# Patient Record
Sex: FEMALE | Race: BLACK OR AFRICAN AMERICAN | NOT HISPANIC OR LATINO | Employment: FULL TIME | URBAN - METROPOLITAN AREA
[De-identification: names, ages, dates, MRNs, and addresses within clinical notes are randomized per-mention and may not be internally consistent; named-entity substitution may affect disease eponyms.]

---

## 2017-01-08 ENCOUNTER — HOSPITAL ENCOUNTER (EMERGENCY)
Facility: HOSPITAL | Age: 47
Discharge: HOME | End: 2017-01-08
Attending: EMERGENCY MEDICINE
Payer: MEDICAID

## 2017-01-08 VITALS
HEART RATE: 83 BPM | TEMPERATURE: 98 F | HEIGHT: 64 IN | RESPIRATION RATE: 18 BRPM | OXYGEN SATURATION: 99 % | DIASTOLIC BLOOD PRESSURE: 66 MMHG | WEIGHT: 160 LBS | SYSTOLIC BLOOD PRESSURE: 139 MMHG | BODY MASS INDEX: 27.31 KG/M2

## 2017-01-08 DIAGNOSIS — K04.01 ACUTE PULPITIS: Primary | ICD-10-CM

## 2017-01-08 PROCEDURE — 99283 EMERGENCY DEPT VISIT LOW MDM: CPT | Mod: 25

## 2017-01-08 PROCEDURE — 63600175 PHARM REV CODE 636 W HCPCS: Performed by: EMERGENCY MEDICINE

## 2017-01-08 PROCEDURE — 96372 THER/PROPH/DIAG INJ SC/IM: CPT

## 2017-01-08 RX ORDER — AMOXICILLIN AND CLAVULANATE POTASSIUM 875; 125 MG/1; MG/1
1 TABLET, FILM COATED ORAL 2 TIMES DAILY
Qty: 20 TABLET | Refills: 0 | Status: SHIPPED | OUTPATIENT
Start: 2017-01-08 | End: 2017-01-18

## 2017-01-08 RX ORDER — KETOROLAC TROMETHAMINE 30 MG/ML
30 INJECTION, SOLUTION INTRAMUSCULAR; INTRAVENOUS
Status: COMPLETED | OUTPATIENT
Start: 2017-01-08 | End: 2017-01-08

## 2017-01-08 RX ADMIN — KETOROLAC TROMETHAMINE 30 MG: 30 INJECTION, SOLUTION INTRAMUSCULAR at 06:01

## 2017-01-08 NOTE — ED AVS SNAPSHOT
[facility]  [place] [id number]               Janna Jacobs   2017  6:12 PM   ED    Description:  Female : 1970   Department:  [facility]           Your Care was Coordinated By:     Provider Role From To    Nolan Crystal MD Attending Provider [phone] --      Reason for Visit     Dental Pain           Diagnoses this Visit        Comments    Acute pulpitis    -  Primary       ED Disposition     None           To Do List           Follow-up Information     Follow up with Dentist. Schedule an appointment as soon as possible for a visit in 1 week.       These Medications        Disp Refills Start End    amoxicillin-clavulanate 875-125mg (AUGMENTIN) 875-125 mg per tablet 20 tablet 0 2017    Take 1 tablet by mouth 2 (two) times daily. - Oral      [facility]     [facility] On Call Nurse Care Line -  Assistance  Registered nurses in the [facility] provide clinical advisement, health education, appointment booking, and other advisory services.  Call for this free service at [phone].             Medications           Message regarding Medications     Verify the changes and/or additions to your medication regime listed below are the same as discussed with your clinician today.  If any of these changes or additions are incorrect, please notify your healthcare provider.        START taking these NEW medications        Refills    amoxicillin-clavulanate 875-125mg (AUGMENTIN) 875-125 mg per tablet 0    Sig: Take 1 tablet by mouth 2 (two) times daily.    Class: Print    Route: Oral           Verify that the below list of medications is an accurate representation of the medications you are currently taking.  If none reported, the list may be blank. If incorrect, please contact your healthcare provider. Carry this list with you in case of emergency.           Current Medications      "amoxicillin-clavulanate 875-125mg (AUGMENTIN) 875-125 mg per tablet Take 1 tablet by mouth 2 (two) times daily.    hydrocodone-acetaminophen 5-325mg (NORCO) 5-325 mg per tablet Take 1 tablet by mouth every 4 (four) hours as needed for Pain.           Clinical Reference Information           Your Vitals Were     BP Pulse Temp Resp Height Weight    134/59 (BP Location: Left arm, Patient Position: Sitting) 96 98.5 °F (36.9 °C) (Oral) 18 5' 4" (1.626 m) 72.6 kg (160 lb)    SpO2 BMI             96% 27.46 kg/m2         Allergies as of 1/8/2017     No Known Allergies      Immunizations Administered on Date of Encounter - 1/8/2017     None      ED Micro, Lab, POCT     None      ED Imaging Orders     None        Discharge Instructions       [facility] [phone]  [facility] [phone]  [facility] [phone]  [facility] 0-6 years - [phone]                                                         7-13 years - [phone]  [facility] - [phone]  [facility] [phone]  [facility] [phone]  [facility] [phone]  [facility] for HIV patients [phone]    Dr. Hummel - [facility] [address] [facility] [phone]    [facility] Sign-Up     Activating your [facility] account is as easy as 1-2-3!     1) Visit [email or web address], select Sign Up Now, enter this activation code and your date of birth, then select Next.  [email or web address]  Expires: 2/22/2017  6:15 PM      2) Create a username and password to use when you visit [facility] in the future and select a security question in case you lose your password and select Next.    3) Enter your e-mail address and click Sign Up!    Additional Information  If you have questions, please e-mail [email or web address] or call [phone] to talk to our [facility] staff. Remember, [facility] is NOT to be " used for urgent needs. For medical emergencies, dial 911.          [facility] complies with applicable Federal civil rights laws and does not discriminate on the basis of race, color, national origin, age, disability, or sex.        Language Assistance Services     ATTENTION: Language assistance services are available, free of charge. Please call [phone].      ATENCIÓN: Si habla español, tiene a bolton disposición servicios gratuitos de asistencia lingüística. Llame al [phone].     CHÚ Ý: N?u b?n nói Ti?ng Vi?t, có các d?ch v? h? tr? ngôn ng? mi?n phí dành cho b?n. G?i s? [phone].

## 2017-01-09 NOTE — ED PROVIDER NOTES
"Encounter Date: 1/8/2017    SCRIBE #1 NOTE: I, Esmer Lund, am scribing for, and in the presence of,  Nolan Crystal MD. I have scribed the following portions of the note - Other sections scribed: ROS and HPI.       History     Chief Complaint   Patient presents with    Dental Pain     "My mouth is swollen and it's hurting."      Review of patient's allergies indicates:  No Known Allergies  HPI Comments: CC: Dental Pain    HPI: This 46 y.o. female with no past medical history on file, presents to the ED complaining of L upper dental pain with associated L sided facial swelling that she noticed upon waking this morning. Symptoms are acute, moderate, and constant. She denies fever, drainage, trouble swallowing or any other associated symptoms. She reports similar facial swelling last week, which was resolved after taking Benadryl. No reported medical intervention today.    The history is provided by the patient. No  was used.     History reviewed. No pertinent past medical history.  Past Medical History Pertinent Negatives   Diagnosis Date Noted    Asthma 12/5/2013    Depression 12/5/2013    Diabetes mellitus 12/5/2013    GERD (gastroesophageal reflux disease) 12/5/2013    Hypertension 12/5/2013     History reviewed. No pertinent past surgical history.  History reviewed. No pertinent family history.  Social History   Substance Use Topics    Smoking status: Never Smoker    Smokeless tobacco: None    Alcohol use No     Review of Systems   Constitutional: Negative for fever.   HENT: Positive for dental problem (L upper) and facial swelling (L). Negative for trouble swallowing.    Respiratory: Negative for cough.    Gastrointestinal: Negative for nausea and vomiting.       Physical Exam   Initial Vitals   BP Pulse Resp Temp SpO2   01/08/17 1742 01/08/17 1742 01/08/17 1742 01/08/17 1742 01/08/17 1742   134/59 96 18 98.5 °F (36.9 °C) 96 %     Physical Exam    Nursing note and vitals " reviewed.  Constitutional: She appears well-developed and well-nourished.   HENT:   Head: Normocephalic and atraumatic.   Infected dental caries noted to the left upper region   Eyes: EOM are normal. Pupils are equal, round, and reactive to light.   Cardiovascular: Normal rate, regular rhythm, normal heart sounds and intact distal pulses.   Pulmonary/Chest: Breath sounds normal. No respiratory distress. She has no wheezes. She has no rhonchi. She has no rales.   Abdominal: Soft. Bowel sounds are normal. She exhibits no distension. There is no tenderness. There is no rebound and no guarding.   Musculoskeletal: Normal range of motion. She exhibits no edema or tenderness.   Neurological: She is alert and oriented to person, place, and time. She has normal strength.   Skin: Skin is warm and dry.   Psychiatric: She has a normal mood and affect.         ED Course   Procedures  Labs Reviewed - No data to display         dental pain secondary to infected dental caries.  Discharge with Augmentin and dental follow-up.             Scribe Attestation:   Scribe #1: I performed the above scribed service and the documentation accurately describes the services I performed. I attest to the accuracy of the note.    Attending Attestation:           Physician Attestation for Scribe:  Physician Attestation Statement for Scribe #1: I, Nolan Crystal MD, reviewed documentation, as scribed by Esmer Lund in my presence, and it is both accurate and complete.                 ED Course     Clinical Impression:   The encounter diagnosis was Acute pulpitis.          Nolan Crystal MD  01/08/17 0967

## 2017-01-09 NOTE — ED TRIAGE NOTES
Pt with c/o left side facial swelling and pain that began this morning when she woke up. Pt denies fever.

## 2017-01-09 NOTE — DISCHARGE INSTRUCTIONS
[facility] [phone]  [facility] [phone]  [facility] [phone]  [facility] 0-6 years - [phone]                                                         7-13 years - [phone]  [facility] - [phone]  [facility] [phone]  [facility] [phone]  [facility] [phone]  [facility] for HIV patients [phone]    Dr. Hummel - [facility] [address] [facility] [phone]

## 2017-03-08 ENCOUNTER — HOSPITAL ENCOUNTER (EMERGENCY)
Facility: HOSPITAL | Age: 47
Discharge: HOME | End: 2017-03-08
Attending: EMERGENCY MEDICINE
Payer: MEDICAID

## 2017-03-08 VITALS
WEIGHT: 179 LBS | BODY MASS INDEX: 35.14 KG/M2 | HEART RATE: 75 BPM | OXYGEN SATURATION: 100 % | HEIGHT: 60 IN | SYSTOLIC BLOOD PRESSURE: 136 MMHG | DIASTOLIC BLOOD PRESSURE: 72 MMHG | RESPIRATION RATE: 16 BRPM | TEMPERATURE: 99 F

## 2017-03-08 DIAGNOSIS — R05.9 COUGH: ICD-10-CM

## 2017-03-08 DIAGNOSIS — R12 HEARTBURN: ICD-10-CM

## 2017-03-08 DIAGNOSIS — J06.9 VIRAL URI WITH COUGH: Primary | ICD-10-CM

## 2017-03-08 LAB
B-HCG UR QL: NEGATIVE
CTP QC/QA: YES

## 2017-03-08 PROCEDURE — 25000003 PHARM REV CODE 250: Performed by: PHYSICIAN ASSISTANT

## 2017-03-08 PROCEDURE — 81025 URINE PREGNANCY TEST: CPT | Performed by: EMERGENCY MEDICINE

## 2017-03-08 PROCEDURE — 99284 EMERGENCY DEPT VISIT MOD MDM: CPT | Mod: 25

## 2017-03-08 PROCEDURE — 93005 ELECTROCARDIOGRAM TRACING: CPT

## 2017-03-08 RX ORDER — BENZONATATE 100 MG/1
100 CAPSULE ORAL 3 TIMES DAILY PRN
Qty: 12 CAPSULE | Refills: 0 | Status: SHIPPED | OUTPATIENT
Start: 2017-03-08 | End: 2017-03-13

## 2017-03-08 RX ORDER — IBUPROFEN 600 MG/1
600 TABLET ORAL EVERY 6 HOURS PRN
Qty: 12 TABLET | Refills: 0 | Status: SHIPPED | OUTPATIENT
Start: 2017-03-08 | End: 2017-03-13

## 2017-03-08 RX ORDER — FAMOTIDINE 20 MG/1
20 TABLET, FILM COATED ORAL 2 TIMES DAILY PRN
Qty: 12 TABLET | Refills: 0 | Status: SHIPPED | OUTPATIENT
Start: 2017-03-08 | End: 2017-03-21

## 2017-03-08 RX ORDER — FAMOTIDINE 20 MG/1
20 TABLET, FILM COATED ORAL
Status: COMPLETED | OUTPATIENT
Start: 2017-03-08 | End: 2017-03-08

## 2017-03-08 RX ORDER — BENZONATATE 100 MG/1
100 CAPSULE ORAL
Status: COMPLETED | OUTPATIENT
Start: 2017-03-08 | End: 2017-03-08

## 2017-03-08 RX ADMIN — BENZONATATE 100 MG: 100 CAPSULE ORAL at 02:03

## 2017-03-08 RX ADMIN — FAMOTIDINE 20 MG: 20 TABLET, FILM COATED ORAL at 02:03

## 2017-03-08 NOTE — DISCHARGE INSTRUCTIONS
Viral Upper Respiratory Illness (Adult)  You have a viral upper respiratory illness (URI), which is another term for the common cold. This illness is contagious during the first few days. It is spread through the air by coughing and sneezing. It may also be spread by direct contact (touching the sick person and then touching your own eyes, nose, or mouth). Frequent handwashing will decrease risk of spread. Most viral illnesses go away within 7 to 10 days with rest and simple home remedies. Sometimes the illness may last for several weeks. Antibiotics will not kill a virus, and they are generally not prescribed for this condition.    Home care  · If symptoms are severe, rest at home for the first 2 to 3 days. When you resume activity, don't let yourself get too tired.  · Avoid being exposed to cigarette smoke (yours or others).  · You may use acetaminophen or ibuprofen to control pain and fever, unless another medicine was prescribed. (Note: If you have chronic liver or kidney disease, have ever had a stomach ulcer or gastrointestinal bleeding, or are taking blood-thinning medicines, talk with your healthcare provider before using these medicines.) Aspirin should never be given to anyone under 18 years of age who is ill with a viral infection or fever. It may cause severe liver or brain damage.  · Your appetite may be poor, so a light diet is fine. Avoid dehydration by drinking 6 to 8 glasses of fluids per day (water, soft drinks, juices, tea, or soup). Extra fluids will help loosen secretions in the nose and lungs.  · Over-the-counter cold medicines will not shorten the length of time youre sick, but they may be helpful for the following symptoms: cough, sore throat, and nasal and sinus congestion. (Note: Do not use decongestants if you have high blood pressure.)  Follow-up care  Follow up with your healthcare provider, or as advised.  When to seek medical advice  Call your healthcare provider right away if any  of these occur:  · Cough with lots of colored sputum (mucus)  · Severe headache; face, neck, or ear pain  · Difficulty swallowing due to throat pain  · Fever of 100.4°F (38°C)  Call 911, or get immediate medical care  Call emergency services right away if any of these occur:  · Chest pain, shortness of breath, wheezing, or difficulty breathing  · Coughing up blood  · Inability to swallow due to throat pain  Date Last Reviewed: 9/13/2015  © 6154-9454 [employer]. [address] [place] [address]. All rights reserved. This information is not intended as a substitute for professional medical care. Always follow your healthcare professional's instructions.

## 2017-03-08 NOTE — ED TRIAGE NOTES
Coughing dry cough and pressure and gagging and throwing up. Co workers with same 2 weeks ago. No fevers. Dizziness, pressure in sinus. No sore throat. + diarrhea.

## 2017-03-08 NOTE — ED PROVIDER NOTES
"Encounter Date: 3/8/2017    SCRIBE #1 NOTE: I, Daniel Taylor, am scribing for, and in the presence of,  Sun Marroquin PA-C. I have scribed the following portions of the note - Other sections scribed: HPI and ROS.       History     Chief Complaint   Patient presents with    Cough     Pt reports cough x 2 weeks     Review of patient's allergies indicates:  No Known Allergies  HPI Comments: CC: Cough     HPI: This 47 y.o F everyday smoker presents to the ED c/o subjective fever, nasal congestion, rhinorrhea and productive cough with clear sputum which began last week. She reports associated nausea, vomiting, diarrhea and dizziness. She also c/o anterior chest wall pain described as "pressure" that is worse with coughing. Pt states she is having worsening heartburn since her arrival at this facility and states it feels similar to her usual heartburn. Pt reports co-workers are experiencing similar symptoms. Pt attempted tx with OTC Tylenol and Nyquil with no relief of symptoms. The pt denies ear pain, sore throat, abdominal pain, HA and blood in stool or vomit.    The history is provided by the patient. No  was used.     Past Medical History:   Diagnosis Date    GERD (gastroesophageal reflux disease)      Past Surgical History:   Procedure Laterality Date    FINGER EXPLORATION      TUBAL LIGATION       History reviewed. No pertinent family history.  Social History   Substance Use Topics    Smoking status: Current Some Day Smoker     Types: Cigarettes    Smokeless tobacco: None    Alcohol use Yes      Comment: occ     Review of Systems   Constitutional: Positive for chills and fever (subjective).   HENT: Positive for congestion (sinus), dental problem, ear pain and rhinorrhea. Negative for sore throat.    Eyes: Negative for discharge, redness and itching.   Respiratory: Positive for cough (productive, clear sputum). Negative for shortness of breath.    Cardiovascular: Positive for chest pain " "("pressure", secondary to cough).   Gastrointestinal: Positive for diarrhea (2 days), nausea and vomiting (3 episodes). Negative for abdominal pain.   Genitourinary: Negative for dysuria, frequency, hematuria and urgency.   Musculoskeletal: Negative for back pain.   Skin: Negative for rash.   Neurological: Positive for dizziness. Negative for numbness and headaches.   Hematological: Does not bruise/bleed easily.       Physical Exam   Initial Vitals   BP Pulse Resp Temp SpO2   03/08/17 1207 03/08/17 1207 03/08/17 1207 03/08/17 1207 03/08/17 1207   108/59 85 20 98.5 °F (36.9 °C) 99 %     Physical Exam    Nursing note and vitals reviewed.  Constitutional: She appears well-developed and well-nourished.   HENT:   Head: Normocephalic and atraumatic.   Right Ear: Hearing, tympanic membrane, external ear and ear canal normal.   Left Ear: Hearing, tympanic membrane, external ear and ear canal normal.   Nose: Mucosal edema and rhinorrhea present. Right sinus exhibits no maxillary sinus tenderness and no frontal sinus tenderness. Left sinus exhibits no maxillary sinus tenderness and no frontal sinus tenderness.   Mouth/Throat: Oropharynx is clear and moist and mucous membranes are normal. No oropharyngeal exudate, posterior oropharyngeal edema or posterior oropharyngeal erythema.   Cardiovascular: Normal rate and regular rhythm.   Pulmonary/Chest: She has decreased breath sounds in the right middle field. She has no wheezes. She has no rhonchi. She has no rales. She exhibits tenderness.   Abdominal: Soft. Normal appearance and bowel sounds are normal. There is no tenderness.         ED Course   Procedures  Labs Reviewed   POCT URINE PREGNANCY             Medical Decision Making:   Initial Assessment:   Patient is a 47-year-old female who presents for evaluation of subjective fever, productive cough, pleuritic chest pain and other cold symptoms. She also c/o worsening heartburn since her arrival. Pt is afebrile, in no acute " distress.  On exam, diminished lung sounds in right middle field with no wheezing, rhonchi or rales. Tenderness to anterior chest wall with palpation. CXR negative for pneumonia or acute abnormalities. EKG ordered and negative for STEMI or acute abnormalities. Patient given Pepcid and Tessalon in ED.  Upon reevaluation, patient reports feeling much improved.  Discharge patient to home with symptomatic treatment of viral URI and GERD-Tessalon, ibuprofen, Pepcid. Provided discharge instructions for GERD and supportive care for viral URI. PCP follow up.  Return to ED if symptoms worsen, if develop chest pain, shortness of breath, or as needed    I discussed this pt with Dr. Rangel who agrees with assessment and plan.  Differential Diagnosis:   PNA, viral URI, bronchitis, MI, GERD, pleuritic CP, PTX            Scribe Attestation:   Scribe #1: I performed the above scribed service and the documentation accurately describes the services I performed. I attest to the accuracy of the note.    Attending Attestation:     Physician Attestation Statement for NP/PA:   I discussed this assessment and plan of this patient with the NP/PA, but I did not personally examine the patient. The face to face encounter was performed by the NP/PA.    Other NP/PA Attestation Additions:      Medical Decision Making: I HAVE REVIEWED THE CASE WITH MY APC AND AGREE WITH THE HISTORY, ROS, PHYSICAL, ASSESSMENT AND PLAN OF CARE AS DOCUMENTED BY MY ADVANCED PRACTICE CLINICIAN.    I DISCUSSED THIS ASSESSMENT AND PLAN OF THIS PATIENT WITH THE APC, BUT I DID NOT PERSONALLY EXAMINE THE PATIENT.  THE FACE TO FACE ENCOUNTER WAS PERFORMED BY THE APC.         Physician Attestation for Scribe:  Physician Attestation Statement for Scribe #1: I, Sun Marroquin PA-C, reviewed documentation, as scribed by Daniel Taylor in my presence, and it is both accurate and complete.                 ED Course     Clinical Impression:   The primary encounter diagnosis was Viral  URI with cough. Diagnoses of Cough and Heartburn were also pertinent to this visit.          Evens Rangel MD  03/12/17 2548

## 2017-03-08 NOTE — ED AVS SNAPSHOT
[facility]  [place] [id number]               Janna Jacobs   3/8/2017  1:35 PM   ED    Description:  Female : 1970   Department:  [facility]           Your Care was Coordinated By:     Provider Role From To    Evens Rangel MD Attending Provider [phone] --    Sun Marroquin PA-C Physician Assistant 17 [phone] --      Reason for Visit     Cough           Diagnoses this Visit        Comments    Viral URI with cough    -  Primary     Cough         Heartburn           ED Disposition     None           To Do List           Follow-up Information     Follow up with Princess Arana NP. Schedule an appointment as soon as possible for a visit in 2 days.    Specialty:  Family Medicine    Why:  for follow up    Contact information:    [place] [id number] [phone]          Go to [facility].    Specialty:  Emergency Medicine    Why:  As needed, If symptoms worsen    Contact information:    [address] [place] [address] [phone]       These Medications        Disp Refills Start End    benzonatate (TESSALON) 100 MG capsule 12 capsule 0 3/8/2017 3/13/2017    Take 1 capsule (100 mg total) by mouth 3 (three) times daily as needed for Cough. - Oral    famotidine (PEPCID) 20 MG tablet 12 tablet 0 3/8/2017 3/13/2017    Take 1 tablet (20 mg total) by mouth 2 (two) times daily as needed for Heartburn. - Oral    ibuprofen (ADVIL,MOTRIN) 600 MG tablet 12 tablet 0 3/8/2017 3/13/2017    Take 1 tablet (600 mg total) by mouth every 6 (six) hours as needed. - Oral      [facility]     [facility] -  Assistance  Registered nurses in the [facility] provide clinical advisement, health education, appointment booking, and other advisory services.  Call for this free service at [phone].             Medications           Message  regarding Medications     Verify the changes and/or additions to your medication regime listed below are the same as discussed with your clinician today.  If any of these changes or additions are incorrect, please notify your healthcare provider.        START taking these NEW medications        Refills    benzonatate (TESSALON) 100 MG capsule 0    Sig: Take 1 capsule (100 mg total) by mouth 3 (three) times daily as needed for Cough.    Class: Print    Route: Oral    famotidine (PEPCID) 20 MG tablet 0    Sig: Take 1 tablet (20 mg total) by mouth 2 (two) times daily as needed for Heartburn.    Class: Print    Route: Oral    ibuprofen (ADVIL,MOTRIN) 600 MG tablet 0    Sig: Take 1 tablet (600 mg total) by mouth every 6 (six) hours as needed.    Class: Print    Route: Oral      These medications were administered today        Dose Freq    benzonatate capsule 100 mg 100 mg ED 1 Time    Sig: Take 1 capsule (100 mg total) by mouth ED 1 Time.    Class: Normal    Route: Oral    Cosign for Ordering: Accepted by Evens Rangel MD on 3/8/2017  2:33 PM    famotidine tablet 20 mg 20 mg ED 1 Time    Sig: Take 1 tablet (20 mg total) by mouth ED 1 Time.    Class: Normal    Route: Oral    Cosign for Ordering: Accepted by Evens Rangel MD on 3/8/2017  2:33 PM           Verify that the below list of medications is an accurate representation of the medications you are currently taking.  If none reported, the list may be blank. If incorrect, please contact your healthcare provider. Carry this list with you in case of emergency.           Current Medications     benzonatate (TESSALON) 100 MG capsule Take 1 capsule (100 mg total) by mouth 3 (three) times daily as needed for Cough.    famotidine (PEPCID) 20 MG tablet Take 1 tablet (20 mg total) by mouth 2 (two) times daily as needed for Heartburn.    famotidine tablet 20 mg Take 1 tablet (20 mg total) by mouth ED 1 Time.    ibuprofen (ADVIL,MOTRIN) 600 MG tablet Take 1 tablet (600 mg total)  by mouth every 6 (six) hours as needed.           Clinical Reference Information           Your Vitals Were     BP Pulse Temp Resp Height Weight    136/72 (BP Location: Left arm, Patient Position: Sitting, BP Method: Automatic) 75 98.7 °F (37.1 °C) (Oral) 16 5' (1.524 m) 81.2 kg (179 lb)    Last Period SpO2 BMI          02/01/2017 (Exact Date) 100% 34.96 kg/m2        Allergies as of 3/8/2017     No Known Allergies      Immunizations Administered on Date of Encounter - 3/8/2017     None      ED Micro, Lab, POCT     Start Ordered       Status Ordering Provider    03/08/17 1331 03/08/17 1331  POCT urine pregnancy  Once      Final result       ED Imaging Orders     Start Ordered       Status Ordering Provider    03/08/17 1351 03/08/17 1355  X-Ray Chest PA And Lateral  1 time imaging      Final result         Discharge Instructions         Viral Upper Respiratory Illness (Adult)  You have a viral upper respiratory illness (URI), which is another term for the common cold. This illness is contagious during the first few days. It is spread through the air by coughing and sneezing. It may also be spread by direct contact (touching the sick person and then touching your own eyes, nose, or mouth). Frequent handwashing will decrease risk of spread. Most viral illnesses go away within 7 to 10 days with rest and simple home remedies. Sometimes the illness may last for several weeks. Antibiotics will not kill a virus, and they are generally not prescribed for this condition.    Home care  · If symptoms are severe, rest at home for the first 2 to 3 days. When you resume activity, don't let yourself get too tired.  · Avoid being exposed to cigarette smoke (yours or others).  · You may use acetaminophen or ibuprofen to control pain and fever, unless another medicine was prescribed. (Note: If you have chronic liver or kidney disease, have ever had a stomach ulcer or gastrointestinal bleeding, or are taking blood-thinning medicines,  talk with your healthcare provider before using these medicines.) Aspirin should never be given to anyone under 18 years of age who is ill with a viral infection or fever. It may cause severe liver or brain damage.  · Your appetite may be poor, so a light diet is fine. Avoid dehydration by drinking 6 to 8 glasses of fluids per day (water, soft drinks, juices, tea, or soup). Extra fluids will help loosen secretions in the nose and lungs.  · Over-the-counter cold medicines will not shorten the length of time youre sick, but they may be helpful for the following symptoms: cough, sore throat, and nasal and sinus congestion. (Note: Do not use decongestants if you have high blood pressure.)  Follow-up care  Follow up with your healthcare provider, or as advised.  When to seek medical advice  Call your healthcare provider right away if any of these occur:  · Cough with lots of colored sputum (mucus)  · Severe headache; face, neck, or ear pain  · Difficulty swallowing due to throat pain  · Fever of 100.4°F (38°C)  Call 911, or get immediate medical care  Call emergency services right away if any of these occur:  · Chest pain, shortness of breath, wheezing, or difficulty breathing  · Coughing up blood  · Inability to swallow due to throat pain  Date Last Reviewed: 9/13/2015  © 2758-7716 [employer]. [address] [place] [address], [place] [address]. All rights reserved. This information is not intended as a substitute for professional medical care. Always follow your healthcare professional's instructions.            Discharge References/Attachments     GASTROESOPHAGEAL REFLUX DISEASE (GERD), DISCHARGE INSTRUCTIONS (ENGLISH)       [facility] complies with applicable Federal civil rights laws and does not discriminate on the basis of race, color, national origin, age, disability, or sex.        Language Assistance Services     ATTENTION: Language assistance services are available, free of charge.  Please call [phone].      ATENCIÓN: Si habla español, tiene a bolton disposición servicios gratuitos de asistencia lingüística. Llame al [phone].     CHÚ Ý: N?u b?n nói Ti?ng Vi?t, có các d?ch v? h? tr? ngôn ng? mi?n phí dành cho b?n. G?i s? [phone].

## 2017-03-21 ENCOUNTER — HOSPITAL ENCOUNTER (EMERGENCY)
Facility: HOSPITAL | Age: 47
Discharge: HOME | End: 2017-03-21
Attending: EMERGENCY MEDICINE
Payer: MEDICAID

## 2017-03-21 VITALS
HEART RATE: 83 BPM | RESPIRATION RATE: 20 BRPM | BODY MASS INDEX: 32.79 KG/M2 | TEMPERATURE: 98 F | DIASTOLIC BLOOD PRESSURE: 87 MMHG | HEIGHT: 60 IN | WEIGHT: 167 LBS | OXYGEN SATURATION: 97 % | SYSTOLIC BLOOD PRESSURE: 136 MMHG

## 2017-03-21 DIAGNOSIS — R09.82 POST-NASAL DRIP: ICD-10-CM

## 2017-03-21 DIAGNOSIS — J01.40 SUBACUTE PANSINUSITIS: Primary | ICD-10-CM

## 2017-03-21 PROCEDURE — 99283 EMERGENCY DEPT VISIT LOW MDM: CPT | Mod: 25

## 2017-03-21 PROCEDURE — 25000003 PHARM REV CODE 250: Performed by: EMERGENCY MEDICINE

## 2017-03-21 PROCEDURE — 96372 THER/PROPH/DIAG INJ SC/IM: CPT

## 2017-03-21 PROCEDURE — 63600175 PHARM REV CODE 636 W HCPCS: Performed by: EMERGENCY MEDICINE

## 2017-03-21 RX ORDER — DEXAMETHASONE SODIUM PHOSPHATE 4 MG/ML
12 INJECTION, SOLUTION INTRA-ARTICULAR; INTRALESIONAL; INTRAMUSCULAR; INTRAVENOUS; SOFT TISSUE
Status: COMPLETED | OUTPATIENT
Start: 2017-03-21 | End: 2017-03-21

## 2017-03-21 RX ORDER — GUAIFENESIN 100 MG/5ML
100-200 SOLUTION ORAL EVERY 4 HOURS PRN
Qty: 60 ML | Refills: 0 | Status: SHIPPED | OUTPATIENT
Start: 2017-03-21 | End: 2017-03-31

## 2017-03-21 RX ORDER — ACETAMINOPHEN 325 MG/1
650 TABLET ORAL
Status: COMPLETED | OUTPATIENT
Start: 2017-03-21 | End: 2017-03-21

## 2017-03-21 RX ORDER — CETIRIZINE HYDROCHLORIDE 10 MG/1
10 TABLET ORAL DAILY
Qty: 30 TABLET | Refills: 0 | Status: SHIPPED | OUTPATIENT
Start: 2017-03-21 | End: 2018-03-21

## 2017-03-21 RX ORDER — FLUTICASONE PROPIONATE 50 MCG
1 SPRAY, SUSPENSION (ML) NASAL 2 TIMES DAILY PRN
Qty: 15 G | Refills: 0 | Status: SHIPPED | OUTPATIENT
Start: 2017-03-21 | End: 2019-02-02

## 2017-03-21 RX ADMIN — ACETAMINOPHEN 650 MG: 325 TABLET ORAL at 08:03

## 2017-03-21 RX ADMIN — DEXAMETHASONE SODIUM PHOSPHATE 12 MG: 4 INJECTION, SOLUTION INTRAMUSCULAR; INTRAVENOUS at 08:03

## 2017-03-21 NOTE — ED AVS SNAPSHOT
[facility]  [place] [address]               Janna Jacobs   3/21/2017  7:29 PM   ED    Description:  Female : 1970   Department:  [facility]           Your Care was Coordinated By:     Provider Role From To    Ning Emery MD Attending Provider 17 --      Reason for Visit     Facial Pain           Diagnoses this Visit        Comments    Subacute pansinusitis    -  Primary     Post-nasal drip           ED Disposition     ED Disposition Condition Comment    Discharge             To Do List           Follow-up Information     Follow up with Princess Arana NP. Schedule an appointment as soon as possible for a visit in 2 days.    Specialty:  Family Medicine    Contact information:    [address] [place] [address] [phone]          Follow up with Nir Boogie - Otorhinolaryngology. Schedule an appointment as soon as possible for a visit in 2 days.    Specialty:  Otolaryngology    Contact information:    [address]  [facility] [address] [phone]    Additional information:    [facility] - 4th Floor        Follow up with Nir Boog[facility] - Allergy/ Immunology. Schedule an appointment as soon as possible for a visit in 2 days.    Specialty:  Allergy    Contact information:    [address]  [facility] [address] [phone]    Additional information:    [facility].       These Medications        Disp Refills Start End    fluticasone (FLONASE) 50 mcg/actuation nasal spray 15 g 0 3/21/2017     1 spray by Each Nare route 2 (two) times daily as needed for Rhinitis. - Each Nare    cetirizine (ZYRTEC) 10 MG tablet 30 tablet 0 3/21/2017 3/21/2018    Take 1 tablet (10 mg total) by mouth once daily. - Oral    guaifenesin 100 mg/5 ml (ROBITUSSIN) 100 mg/5 mL syrup 60 mL 0 3/21/2017 3/31/2017    Take 5-10 mLs (100-200 mg total) by mouth every 4  (four) hours as needed for Cough. - Oral      [facility]     [facility] -  Assistance  Registered nurses in the [facility] provide clinical advisement, health education, appointment booking, and other advisory services.  Call for this free service at [phone].             Medications           Message regarding Medications     Verify the changes and/or additions to your medication regime listed below are the same as discussed with your clinician today.  If any of these changes or additions are incorrect, please notify your healthcare provider.        START taking these NEW medications        Refills    fluticasone (FLONASE) 50 mcg/actuation nasal spray 0    Si spray by Each Nare route 2 (two) times daily as needed for Rhinitis.    Class: Print    Route: Each Nare    cetirizine (ZYRTEC) 10 MG tablet 0    Sig: Take 1 tablet (10 mg total) by mouth once daily.    Class: Print    Route: Oral    guaifenesin 100 mg/5 ml (ROBITUSSIN) 100 mg/5 mL syrup 0    Sig: Take 5-10 mLs (100-200 mg total) by mouth every 4 (four) hours as needed for Cough.    Class: Print    Route: Oral      These medications were administered today        Dose Freq    dexamethasone injection 12 mg 12 mg ED 1 Time    Sig: Inject 3 mLs (12 mg total) into the muscle ED 1 Time.    Class: Normal    Route: Intramuscular    acetaminophen tablet 650 mg 650 mg ED 1 Time    Sig: Take 2 tablets (650 mg total) by mouth ED 1 Time.    Class: Normal    Route: Oral      STOP taking these medications     famotidine (PEPCID) 20 MG tablet Take 1 tablet (20 mg total) by mouth 2 (two) times daily as needed for Heartburn.           Verify that the below list of medications is an accurate representation of the medications you are currently taking.  If none reported, the list may be blank. If incorrect, please contact your healthcare provider. Carry this list with you in case of emergency.           Current Medications      acetaminophen tablet 650 mg Take 2 tablets (650 mg total) by mouth ED 1 Time.    cetirizine (ZYRTEC) 10 MG tablet Take 1 tablet (10 mg total) by mouth once daily.    dexamethasone injection 12 mg Inject 3 mLs (12 mg total) into the muscle ED 1 Time.    fluticasone (FLONASE) 50 mcg/actuation nasal spray 1 spray by Each Nare route 2 (two) times daily as needed for Rhinitis.    guaifenesin 100 mg/5 ml (ROBITUSSIN) 100 mg/5 mL syrup Take 5-10 mLs (100-200 mg total) by mouth every 4 (four) hours as needed for Cough.           Clinical Reference Information           Your Vitals Were     BP Pulse Temp Resp Height Weight    120/76 (BP Location: Right arm, Patient Position: Sitting) 95 98.5 °F (36.9 °C) (Oral) 16 5' (1.524 m) 75.8 kg (167 lb)    Last Period SpO2 BMI          03/01/2017 99% 32.61 kg/m2        Allergies as of 3/21/2017     No Known Allergies      Immunizations Administered on Date of Encounter - 3/21/2017     None      ED Micro, Lab, POCT     None      ED Imaging Orders     None        Discharge Instructions         Understanding Sinus Problems    You dont often think about your sinuses until theres a problem. One day you realize you cant smell dinner cooking. Or you find you often have headaches or problems breathing through your nose.  Symptoms of sinus problems  Sinus problems can cause uncomfortable symptoms. Your nose may run constantly. You might have trouble sleeping at night. You may even lose your sense of smell. Other symptoms can include:  · Nasal congestion  · Fullness in ears  · Green, yellow, or bloody drainage from the nose  · Trouble tasting food  · Frequent headaches  · Facial pain  · Cough  When sinuses are blocked  If something blocks the passages in the nose or sinuses, mucus cant drain. Mucus-filled sinuses often become infected.  · Colds cause the lining of the nose and sinuses to swell and make extra mucus. A buildup of mucus can lead to a more serious infection.  · Allergies  irritate turbinates and other tissues. This causes swelling, which can cause a blockage. Over time, this irritation can also lead to sacs of swollen tissue (polyps).  · Polyps may form in both the sinuses and nose. Polyps can grow large enough to clog nasal passages and block drainage.  · A crooked (deviated) septum may block nasal passages. This is often the result of an injury.  Date Last Reviewed: 11/1/2016  © 8202-1956 [employer]. [address] [place] [address], [place] [address]. All rights reserved. This information is not intended as a substitute for professional medical care. Always follow your healthcare professional's instructions.          Discharge References/Attachments     SINUSITIS (NO ANTIBIOTICS) (ENGLISH)      Smoking Cessation     If you would like to quit smoking:   You may be eligible for free services if you are a [place] resident and started smoking cigarettes before September 1, 1988.  Call the Smoking Cessation Trust (SCT) toll free at (056) 802-1338 or (544) 252-7817.   Call 1-800-QUIT-NOW if you do not meet the above criteria.             [facility] complies with applicable Federal civil rights laws and does not discriminate on the basis of race, color, national origin, age, disability, or sex.        Language Assistance Services     ATTENTION: Language assistance services are available, free of charge. Please call [phone].      ATENCIÓN: Si habla español, tiene a bolton disposición servicios gratuitos de asistencia lingüística. Llame al [phone].     CHÚ Ý: N?u b?n nói Ti?ng Vi?t, có các d?ch v? h? tr? ngôn ng? mi?n phí dành cho b?n. G?i s? [phone].

## 2017-03-22 NOTE — DISCHARGE INSTRUCTIONS
Understanding Sinus Problems    You dont often think about your sinuses until theres a problem. One day you realize you cant smell dinner cooking. Or you find you often have headaches or problems breathing through your nose.  Symptoms of sinus problems  Sinus problems can cause uncomfortable symptoms. Your nose may run constantly. You might have trouble sleeping at night. You may even lose your sense of smell. Other symptoms can include:  · Nasal congestion  · Fullness in ears  · Green, yellow, or bloody drainage from the nose  · Trouble tasting food  · Frequent headaches  · Facial pain  · Cough  When sinuses are blocked  If something blocks the passages in the nose or sinuses, mucus cant drain. Mucus-filled sinuses often become infected.  · Colds cause the lining of the nose and sinuses to swell and make extra mucus. A buildup of mucus can lead to a more serious infection.  · Allergies irritate turbinates and other tissues. This causes swelling, which can cause a blockage. Over time, this irritation can also lead to sacs of swollen tissue (polyps).  · Polyps may form in both the sinuses and nose. Polyps can grow large enough to clog nasal passages and block drainage.  · A crooked (deviated) septum may block nasal passages. This is often the result of an injury.  Date Last Reviewed: 11/1/2016  © 7986-3362 [employer]. [address] [place] [address], [place] [address]. All rights reserved. This information is not intended as a substitute for professional medical care. Always follow your healthcare professional's instructions.

## 2017-03-22 NOTE — ED TRIAGE NOTES
Patient c/o sinus congestion, cough, sore throat for 2 weeks. Patient states it is draining into her throat and hurting her throat.

## 2017-03-22 NOTE — ED PROVIDER NOTES
"Encounter Date: 3/21/2017    SCRIBE #1 NOTE: I, Jelani Juarez, am scribing for, and in the presence of,  Ning Emery MD. I have scribed the following portions of the note - Other sections scribed: ROS, HPI, PE.       History     Chief Complaint   Patient presents with    Facial Pain     Pt. c/o facial pressure, cough, and nasal congestion. Pt. states, "I feel it running in the back of my throat and it chokes me".      Review of patient's allergies indicates:  No Known Allergies  HPI Comments: CC: Facial Pain    HPI: Patient is a 47 y.o. F with a past medical history of GERD who presents to the ED for evaluation of acute onset post-nasal drip, cough, subjective fever, nasal congestion, neck pain, decreased appetite and throat itching x1 week. Pain is severe and constant. She attempted treatment with Tylenol and Mucinex with no relief. She denies painful swallowing, wheezing, facial pain, chest pain, and/or shortness of breath. Multiple similar prior episodes. Patient reports being evaluated in the ED last week for the same complaints. She has not followed up with her allergist. No recent steroid injections.      The history is provided by the patient. No  was used.     Past Medical History:   Diagnosis Date    GERD (gastroesophageal reflux disease)      Past Surgical History:   Procedure Laterality Date    FINGER EXPLORATION      TUBAL LIGATION       History reviewed. No pertinent family history.  Social History   Substance Use Topics    Smoking status: Former Smoker     Types: Cigarettes    Smokeless tobacco: None    Alcohol use Yes      Comment: occ     Review of Systems   Constitutional: Positive for appetite change (decreased) and fever (subjective).   HENT: Positive for congestion and postnasal drip. Negative for trouble swallowing.    Eyes: Negative for redness.   Respiratory: Positive for cough. Negative for wheezing.    Cardiovascular: Negative for chest pain. "   Gastrointestinal: Negative for abdominal pain, diarrhea, nausea and vomiting.   Genitourinary: Negative for dysuria.   Musculoskeletal: Positive for neck pain.   Skin: Negative for rash.   Neurological: Negative for headaches.       Physical Exam   Initial Vitals   BP Pulse Resp Temp SpO2   03/21/17 1856 03/21/17 1856 03/21/17 1856 03/21/17 1856 03/21/17 1856   120/76 95 16 98.5 °F (36.9 °C) 99 %     Physical Exam    Nursing note and vitals reviewed.  Constitutional: She appears well-developed and well-nourished. She is cooperative.  Non-toxic appearance. No distress.   HENT:   Head: Normocephalic and atraumatic.   Right Ear: Tympanic membrane normal.   Left Ear: Tympanic membrane normal.   Nose: Mucosal edema and rhinorrhea present. Right sinus exhibits no maxillary sinus tenderness and no frontal sinus tenderness. Left sinus exhibits no maxillary sinus tenderness and no frontal sinus tenderness.   Mouth/Throat: Oropharynx is clear and moist and mucous membranes are normal. No oral lesions. No uvula swelling. No posterior oropharyngeal edema or posterior oropharyngeal erythema.   Eyes: Conjunctivae, EOM and lids are normal. Pupils are equal, round, and reactive to light. Right conjunctiva is not injected. Left conjunctiva is not injected.   Neck: Normal range of motion and full passive range of motion without pain. Neck supple. No thyromegaly present. No JVD present.   Cardiovascular: Normal rate, regular rhythm, normal heart sounds and normal pulses.   Pulmonary/Chest: Effort normal and breath sounds normal. No respiratory distress.   Abdominal: Soft. Normal appearance and bowel sounds are normal. She exhibits no distension and no mass. There is no tenderness.   Musculoskeletal: Normal range of motion.   Neurological: She is alert and oriented to person, place, and time. She has normal strength. No cranial nerve deficit or sensory deficit.   Skin: Skin is warm, dry and intact. No rash noted.   Psychiatric: She  has a normal mood and affect. Her speech is normal and behavior is normal. Judgment and thought content normal.         ED Course   Procedures  Labs Reviewed - No data to display          Medical Decision Making:   Initial Assessment:   Urgent evaluation a 47-year-old female here with second visit for URI symptoms, was seen here 2 weeks previously for cough, and has developed more nasal congestion, and sore throat since that time.  Patient denies sputum production, no wheezing, does report fever 102 yesterday for which she took Tylenol.  Patient was previously sent home with Tessalon pearls, which she endorses have not improved her symptoms.  Chief complaint of facial pain, not reproducible on sinus palpation, however posterior oropharynx with erythema and boggy turbinates bilaterally.  I presume patient's discomfort is still viral in etiology, now with progression to sinusitis and postnasal drip symptoms.            Scribe Attestation:   Scribe #1: I performed the above scribed service and the documentation accurately describes the services I performed. I attest to the accuracy of the note.    Attending Attestation:           Physician Attestation for Scribe:  Physician Attestation Statement for Scribe #1: I, Ning Emery MD, reviewed documentation, as scribed by Jelani Juarez in my presence, and it is both accurate and complete.                 ED Course     Clinical Impression:   The primary encounter diagnosis was Subacute pansinusitis. A diagnosis of Post-nasal drip was also pertinent to this visit.    Disposition:   Disposition: Discharged  Condition: Stable       Ning Emery MD  03/21/17 2118       Ning Emery MD  04/05/17 1430

## 2017-05-15 ENCOUNTER — HOSPITAL ENCOUNTER (INPATIENT)
Facility: HOSPITAL | Age: 47
LOS: 2 days | Discharge: HOME | DRG: 392 | End: 2017-05-17
Attending: EMERGENCY MEDICINE | Admitting: EMERGENCY MEDICINE
Payer: MEDICAID

## 2017-05-15 DIAGNOSIS — R11.2 NAUSEA VOMITING AND DIARRHEA: ICD-10-CM

## 2017-05-15 DIAGNOSIS — A41.9 SEPSIS, DUE TO UNSPECIFIED ORGANISM: ICD-10-CM

## 2017-05-15 DIAGNOSIS — K52.9 ENTEROCOLITIS: Primary | ICD-10-CM

## 2017-05-15 DIAGNOSIS — K21.9 GASTROESOPHAGEAL REFLUX DISEASE, ESOPHAGITIS PRESENCE NOT SPECIFIED: ICD-10-CM

## 2017-05-15 DIAGNOSIS — D72.9 NEUTROPHILIC LEUKOCYTOSIS: ICD-10-CM

## 2017-05-15 DIAGNOSIS — R19.7 NAUSEA VOMITING AND DIARRHEA: ICD-10-CM

## 2017-05-15 DIAGNOSIS — J06.9 VIRAL URI WITH COUGH: ICD-10-CM

## 2017-05-15 DIAGNOSIS — R50.9 ACUTE FEBRILE ILLNESS: ICD-10-CM

## 2017-05-15 PROBLEM — D72.828 NEUTROPHILIC LEUKOCYTOSIS: Status: ACTIVE | Noted: 2017-05-15

## 2017-05-15 LAB
ALBUMIN SERPL BCP-MCNC: 3.5 G/DL
ALP SERPL-CCNC: 59 U/L
ALT SERPL W/O P-5'-P-CCNC: 10 U/L
ANION GAP SERPL CALC-SCNC: 11 MMOL/L
AST SERPL-CCNC: 16 U/L
B-HCG UR QL: NEGATIVE
BACTERIA #/AREA URNS HPF: NORMAL /HPF
BASOPHILS # BLD AUTO: 0.03 K/UL
BASOPHILS NFR BLD: 0.1 %
BILIRUB SERPL-MCNC: 0.5 MG/DL
BILIRUB UR QL STRIP: NEGATIVE
BUN SERPL-MCNC: 5 MG/DL
CALCIUM SERPL-MCNC: 9.8 MG/DL
CHLORIDE SERPL-SCNC: 100 MMOL/L
CLARITY UR: CLEAR
CO2 SERPL-SCNC: 23 MMOL/L
COLOR UR: YELLOW
CREAT SERPL-MCNC: 0.8 MG/DL
CTP QC/QA: YES
DIFFERENTIAL METHOD: ABNORMAL
EOSINOPHIL # BLD AUTO: 0.1 K/UL
EOSINOPHIL NFR BLD: 0.2 %
ERYTHROCYTE [DISTWIDTH] IN BLOOD BY AUTOMATED COUNT: 14 %
EST. GFR  (AFRICAN AMERICAN): >60 ML/MIN/1.73 M^2
EST. GFR  (NON AFRICAN AMERICAN): >60 ML/MIN/1.73 M^2
GLUCOSE SERPL-MCNC: 96 MG/DL
GLUCOSE UR QL STRIP: NEGATIVE
HCT VFR BLD AUTO: 40 %
HGB BLD-MCNC: 13.7 G/DL
HGB UR QL STRIP: ABNORMAL
KETONES UR QL STRIP: NEGATIVE
LACTATE SERPL-SCNC: 1.3 MMOL/L
LEUKOCYTE ESTERASE UR QL STRIP: NEGATIVE
LIPASE SERPL-CCNC: 8 U/L
LYMPHOCYTES # BLD AUTO: 2.9 K/UL
LYMPHOCYTES NFR BLD: 14.1 %
MCH RBC QN AUTO: 29.4 PG
MCHC RBC AUTO-ENTMCNC: 34.3 %
MCV RBC AUTO: 86 FL
MICROSCOPIC COMMENT: NORMAL
MONOCYTES # BLD AUTO: 2.2 K/UL
MONOCYTES NFR BLD: 10.6 %
NEUTROPHILS # BLD AUTO: 15.6 K/UL
NEUTROPHILS NFR BLD: 75.4 %
NITRITE UR QL STRIP: NEGATIVE
PH UR STRIP: 6 [PH] (ref 5–8)
PLATELET # BLD AUTO: 299 K/UL
PMV BLD AUTO: 10.7 FL
POTASSIUM SERPL-SCNC: 4 MMOL/L
PROT SERPL-MCNC: 8 G/DL
PROT UR QL STRIP: NEGATIVE
RBC # BLD AUTO: 4.66 M/UL
RBC #/AREA URNS HPF: 3 /HPF (ref 0–4)
SODIUM SERPL-SCNC: 134 MMOL/L
SP GR UR STRIP: 1.01 (ref 1–1.03)
SQUAMOUS #/AREA URNS HPF: 8 /HPF
URN SPEC COLLECT METH UR: ABNORMAL
UROBILINOGEN UR STRIP-ACNC: ABNORMAL EU/DL
WBC # BLD AUTO: 20.74 K/UL
WBC #/AREA URNS HPF: 0 /HPF (ref 0–5)

## 2017-05-15 PROCEDURE — 99285 EMERGENCY DEPT VISIT HI MDM: CPT

## 2017-05-15 PROCEDURE — 96367 TX/PROPH/DG ADDL SEQ IV INF: CPT

## 2017-05-15 PROCEDURE — 63600175 PHARM REV CODE 636 W HCPCS: Performed by: NURSE PRACTITIONER

## 2017-05-15 PROCEDURE — 83605 ASSAY OF LACTIC ACID: CPT

## 2017-05-15 PROCEDURE — 81025 URINE PREGNANCY TEST: CPT | Performed by: NURSE PRACTITIONER

## 2017-05-15 PROCEDURE — 25000003 PHARM REV CODE 250: Performed by: NURSE PRACTITIONER

## 2017-05-15 PROCEDURE — 87040 BLOOD CULTURE FOR BACTERIA: CPT | Mod: 59

## 2017-05-15 PROCEDURE — 96365 THER/PROPH/DIAG IV INF INIT: CPT

## 2017-05-15 PROCEDURE — 83690 ASSAY OF LIPASE: CPT

## 2017-05-15 PROCEDURE — 96361 HYDRATE IV INFUSION ADD-ON: CPT

## 2017-05-15 PROCEDURE — 11000001 HC ACUTE MED/SURG PRIVATE ROOM

## 2017-05-15 PROCEDURE — 96375 TX/PRO/DX INJ NEW DRUG ADDON: CPT

## 2017-05-15 PROCEDURE — 25500020 PHARM REV CODE 255: Performed by: EMERGENCY MEDICINE

## 2017-05-15 PROCEDURE — 85025 COMPLETE CBC W/AUTO DIFF WBC: CPT

## 2017-05-15 PROCEDURE — 96376 TX/PRO/DX INJ SAME DRUG ADON: CPT

## 2017-05-15 PROCEDURE — 80053 COMPREHEN METABOLIC PANEL: CPT

## 2017-05-15 PROCEDURE — 81000 URINALYSIS NONAUTO W/SCOPE: CPT

## 2017-05-15 RX ORDER — SODIUM CHLORIDE 0.9 % (FLUSH) 0.9 %
3 SYRINGE (ML) INJECTION EVERY 8 HOURS
Status: DISCONTINUED | OUTPATIENT
Start: 2017-05-16 | End: 2017-05-17 | Stop reason: HOSPADM

## 2017-05-15 RX ORDER — HYDROMORPHONE HYDROCHLORIDE 2 MG/ML
0.5 INJECTION, SOLUTION INTRAMUSCULAR; INTRAVENOUS; SUBCUTANEOUS
Status: DISCONTINUED | OUTPATIENT
Start: 2017-05-15 | End: 2017-05-16

## 2017-05-15 RX ORDER — HYDROMORPHONE HYDROCHLORIDE 2 MG/ML
1 INJECTION, SOLUTION INTRAMUSCULAR; INTRAVENOUS; SUBCUTANEOUS
Status: COMPLETED | OUTPATIENT
Start: 2017-05-15 | End: 2017-05-15

## 2017-05-15 RX ORDER — ACETAMINOPHEN 325 MG/1
650 TABLET ORAL EVERY 8 HOURS PRN
Status: DISCONTINUED | OUTPATIENT
Start: 2017-05-15 | End: 2017-05-17 | Stop reason: HOSPADM

## 2017-05-15 RX ORDER — ACETAMINOPHEN 325 MG/1
650 TABLET ORAL
Status: COMPLETED | OUTPATIENT
Start: 2017-05-15 | End: 2017-05-15

## 2017-05-15 RX ORDER — ONDANSETRON 2 MG/ML
4 INJECTION INTRAMUSCULAR; INTRAVENOUS EVERY 12 HOURS PRN
Status: DISCONTINUED | OUTPATIENT
Start: 2017-05-15 | End: 2017-05-15

## 2017-05-15 RX ORDER — ONDANSETRON 2 MG/ML
4 INJECTION INTRAMUSCULAR; INTRAVENOUS
Status: COMPLETED | OUTPATIENT
Start: 2017-05-15 | End: 2017-05-15

## 2017-05-15 RX ORDER — CIPROFLOXACIN 2 MG/ML
400 INJECTION, SOLUTION INTRAVENOUS
Status: DISCONTINUED | OUTPATIENT
Start: 2017-05-16 | End: 2017-05-17 | Stop reason: HOSPADM

## 2017-05-15 RX ORDER — SODIUM CHLORIDE 9 MG/ML
INJECTION, SOLUTION INTRAVENOUS CONTINUOUS
Status: DISCONTINUED | OUTPATIENT
Start: 2017-05-15 | End: 2017-05-17 | Stop reason: HOSPADM

## 2017-05-15 RX ORDER — METRONIDAZOLE 500 MG/100ML
500 INJECTION, SOLUTION INTRAVENOUS
Status: COMPLETED | OUTPATIENT
Start: 2017-05-15 | End: 2017-05-15

## 2017-05-15 RX ORDER — METRONIDAZOLE 500 MG/100ML
500 INJECTION, SOLUTION INTRAVENOUS
Status: DISCONTINUED | OUTPATIENT
Start: 2017-05-16 | End: 2017-05-17

## 2017-05-15 RX ORDER — RAMELTEON 8 MG/1
8 TABLET ORAL NIGHTLY PRN
Status: DISCONTINUED | OUTPATIENT
Start: 2017-05-16 | End: 2017-05-17 | Stop reason: HOSPADM

## 2017-05-15 RX ORDER — ONDANSETRON 2 MG/ML
4 INJECTION INTRAMUSCULAR; INTRAVENOUS EVERY 8 HOURS PRN
Status: DISCONTINUED | OUTPATIENT
Start: 2017-05-16 | End: 2017-05-17 | Stop reason: HOSPADM

## 2017-05-15 RX ORDER — CIPROFLOXACIN 2 MG/ML
400 INJECTION, SOLUTION INTRAVENOUS
Status: COMPLETED | OUTPATIENT
Start: 2017-05-15 | End: 2017-05-15

## 2017-05-15 RX ORDER — PROMETHAZINE HYDROCHLORIDE 25 MG/1
25 SUPPOSITORY RECTAL EVERY 6 HOURS PRN
Status: DISCONTINUED | OUTPATIENT
Start: 2017-05-16 | End: 2017-05-17 | Stop reason: HOSPADM

## 2017-05-15 RX ADMIN — IOHEXOL 15 ML: 300 INJECTION, SOLUTION INTRAVENOUS at 05:05

## 2017-05-15 RX ADMIN — ACETAMINOPHEN 650 MG: 325 TABLET, FILM COATED ORAL at 06:05

## 2017-05-15 RX ADMIN — HYDROMORPHONE HYDROCHLORIDE 0.5 MG: 2 INJECTION INTRAMUSCULAR; INTRAVENOUS; SUBCUTANEOUS at 10:05

## 2017-05-15 RX ADMIN — HYDROMORPHONE HYDROCHLORIDE 1 MG: 2 INJECTION INTRAMUSCULAR; INTRAVENOUS; SUBCUTANEOUS at 07:05

## 2017-05-15 RX ADMIN — HYDROMORPHONE HYDROCHLORIDE 1 MG: 2 INJECTION INTRAMUSCULAR; INTRAVENOUS; SUBCUTANEOUS at 05:05

## 2017-05-15 RX ADMIN — SODIUM CHLORIDE: 0.9 INJECTION, SOLUTION INTRAVENOUS at 09:05

## 2017-05-15 RX ADMIN — ONDANSETRON 4 MG: 2 INJECTION INTRAMUSCULAR; INTRAVENOUS at 05:05

## 2017-05-15 RX ADMIN — CIPROFLOXACIN 400 MG: 2 INJECTION, SOLUTION INTRAVENOUS at 08:05

## 2017-05-15 RX ADMIN — SODIUM CHLORIDE 1000 ML: 0.9 INJECTION, SOLUTION INTRAVENOUS at 07:05

## 2017-05-15 RX ADMIN — METRONIDAZOLE 500 MG: 500 INJECTION, SOLUTION INTRAVENOUS at 07:05

## 2017-05-15 RX ADMIN — SODIUM CHLORIDE 1000 ML: 0.9 INJECTION, SOLUTION INTRAVENOUS at 04:05

## 2017-05-15 RX ADMIN — IOHEXOL 100 ML: 350 INJECTION, SOLUTION INTRAVENOUS at 05:05

## 2017-05-15 RX ADMIN — ONDANSETRON 4 MG: 2 INJECTION INTRAMUSCULAR; INTRAVENOUS at 07:05

## 2017-05-15 NOTE — ED PROVIDER NOTES
Encounter Date: 5/15/2017    SCRIBE #1 NOTE: I, Lex Pang , am scribing for, and in the presence of,  Alexandria Concepcion NP . I have scribed the following portions of the note - Other sections scribed: HPI/ROS .       History     Chief Complaint   Patient presents with    Abdominal Pain     lower abdominal pain x 3 days with subjective fever/chills, nausea, emesis (3 in last 24hrs), diarrhea (4 in last 24 hrs)    Fever    Emesis    Diarrhea     Review of patient's allergies indicates:  No Known Allergies  HPI Comments: CC: Abdominal Pain     HPI: This 47 y.o. female with hx of GERD presents to the ED c/o a 3-day hx of acute-onset generalized abdominal pain that is worst to the R side that has not improved since onset. She reports associated subjective fevers and notes that she has no appetite. Yesterday, pt reports three episodes of NBNB vomiting but none today. She also reports rectal tenesmus, stating that she had to strain to pass a small bowel movement yesterday. She also had 2-3 non-bloody diarrheal episodes this morning. No prior attempted tx. No known alleviating or exacerbating factors. No hx of abdominal surgeries. Pt otherwise denies dysuria, irregular vaginal bleeding, vaginal d/c, and any other associated symptoms.       The history is provided by the patient. No  was used.     Past Medical History:   Diagnosis Date    GERD (gastroesophageal reflux disease)      Past Surgical History:   Procedure Laterality Date    FINGER EXPLORATION      TUBAL LIGATION       History reviewed. No pertinent family history.  Social History   Substance Use Topics    Smoking status: Former Smoker     Types: Cigarettes    Smokeless tobacco: None    Alcohol use Yes      Comment: occ     Review of Systems   Constitutional: Positive for appetite change (decreased) and fever (subjective). Negative for chills.   HENT: Negative for congestion.    Eyes: Negative for visual disturbance.   Respiratory:  Negative for cough and shortness of breath.    Cardiovascular: Negative for chest pain.   Gastrointestinal: Positive for abdominal pain, constipation, diarrhea, nausea and vomiting. Negative for blood in stool.        (+) tenesmus    Genitourinary: Negative for dysuria, frequency, hematuria, vaginal bleeding and vaginal discharge.   Musculoskeletal: Negative for back pain and neck pain.   Skin: Negative for rash.   Neurological: Negative for headaches.       Physical Exam   Initial Vitals   BP Pulse Resp Temp SpO2   05/15/17 1557 05/15/17 1557 05/15/17 1557 05/15/17 1557 05/15/17 1557   130/85 121 20 100.7 °F (38.2 °C) 98 %     Physical Exam    Nursing note and vitals reviewed.  Constitutional: She appears well-developed and well-nourished. She is not diaphoretic.   Eyes: Conjunctivae and EOM are normal. Pupils are equal, round, and reactive to light.   Neck: Normal range of motion. Neck supple.   Pulmonary/Chest: No respiratory distress.   Abdominal: Soft. Normal appearance. She exhibits no distension and no mass. There is tenderness (diffuse lower abdominal tenderness with slight guarding). There is no rigidity and negative Hobson's sign. No hernia.   Musculoskeletal: Normal range of motion.   Neurological: She is alert and oriented to person, place, and time. She has normal strength.   Skin: Skin is warm and dry.   Psychiatric: She has a normal mood and affect.         ED Course   Procedures  Labs Reviewed   CBC W/ AUTO DIFFERENTIAL - Abnormal; Notable for the following:        Result Value    WBC 20.74 (*)     Gran # 15.6 (*)     Mono # 2.2 (*)     Gran% 75.4 (*)     Lymph% 14.1 (*)     All other components within normal limits   COMPREHENSIVE METABOLIC PANEL - Abnormal; Notable for the following:     Sodium 134 (*)     BUN, Bld 5 (*)     All other components within normal limits   URINALYSIS - Abnormal; Notable for the following:     Occult Blood UA 3+ (*)     Urobilinogen, UA 4.0-6.0 (*)     All other  components within normal limits   CULTURE, BLOOD   CULTURE, BLOOD   LIPASE   URINALYSIS MICROSCOPIC   LACTIC ACID, PLASMA   POCT URINE PREGNANCY             Medical Decision Making:   History:   Old Medical Records: I decided to obtain old medical records.    Additional MDM:   Comments: This is an urgent evaluation of a 47-year-old female that comes to the emergency room complaining of abdominal pain with fever, nausea, vomiting, and diarrhea.  She presents with a fever of 100.7°F.  She is also tachycardic.  She is normotensive.  She appears ill and uncomfortable on exam, with diffuse lower abdominal tenderness to palpation and slight guarding.  She localizes the worse pain over her right lower quadrant.  She has a history of tubal ligation, but otherwise denies intra-abdominal surgeries.  Her differential diagnoses include: Appendicitis, diverticulitis, colitis, cholecystitis, perforated viscus, UTI/pyelonephritis, sepsis.  She was referred for CT abdomen and pelvis with contrast which revealed wall thickening of the small and large bowel loops with minimal distention.  Findings were suggestive of nonspecific enterocolitis.  Correlated with her labs, she had a leukocytosis of 20K with a left shift; I suspect this is most likely a infectious colitis.  She had no bandemia.  Will send lactic acid and blood cultures.  Her labs were otherwise unremarkable, with no renal insufficiency or gross electrolyte disturbance.  Her transaminases and lipase were within normal limits.  In light of her significant leukocytosis and fever, I think she warrants admission to hospital for IV antibiotics and continued evaluation.  She was given antipyretics, 2L NS, antiemetics, and pain control.  She appears more comfortable on re-evaluation and is no longer febrile or tachycardic.  Case was discussed with hospital medicine, as well as my supervising attending, , and they are both in agreement with plan. Stable for  admission.  .          Scribe Attestation:   Scribe #1: I performed the above scribed service and the documentation accurately describes the services I performed. I attest to the accuracy of the note.    Attending Attestation:     Physician Attestation Statement for NP/PA:   I discussed this assessment and plan of this patient with the NP/PA, but I did not personally examine the patient. The face to face encounter was performed by the NP/PA.    Other NP/PA Attestation Additions:      Medical Decision Makin y.o. Female presents to the emergency department complaining of abdominal pain.  Patient febrile and tachycardic at triage.  Labs reveal leukocytosis, white blood cell count 20.74.  CT abdomen and pelvis ordered.  This shows inflammatory changes consistent with ileitis.  Due to the patient's leukocytosis, fever, hospitalist consulted for admission for IV antibiotics, IV hydration and close monitoring. I have discussed this patient with mid-level provider and agree with physical exam, assessment and plan.         Physician Attestation for Scribe:  Physician Attestation Statement for Scribe #1: I, Alexandria Concepcion NP , reviewed documentation, as scribed by Lex Pang  in my presence, and it is both accurate and complete.                 ED Course     Clinical Impression:   The primary encounter diagnosis was Enterocolitis. Diagnoses of Nausea vomiting and diarrhea and Acute febrile illness were also pertinent to this visit.    Disposition:   Disposition: Admitted  Condition: Stable       Alexandria Concepcion NP  05/15/17 2022       Trice Collier MD  05/15/17 6111

## 2017-05-15 NOTE — IP AVS SNAPSHOT
[place] [address] [address] [place] [address]  Phone: [phone]           Patient Discharge Instructions   Our goal is to set you up for success. This packet includes information on your condition, medications, and your home care.  It will help you care for yourself to prevent having to return to the hospital.     Please ask your nurse if you have any questions.      There are many details to remember when preparing to leave the hospital. Here is what you will need to do:    1. Take your medicine. If you are prescribed medications, review your Medication List on the following pages. You may have new medications to  at the pharmacy and others that you'll need to stop taking. Review the instructions for how and when to take your medications. Talk with your doctor or nurses if you are unsure of what to do.     2. Go to your follow-up appointments. Specific follow-up information is listed in the following pages. Your may be contacted by a nurse or clinical provider about future appointments. Be sure we have all of the phone numbers to reach you. Please contact your provider's office if you are unable to make an appointment.     3. Watch for warning signs. Your doctor or nurse will give you detailed warning signs to watch for and when to call for assistance. These instructions may also include educational information about your condition. If you experience any of warning signs to your health, call your doctor.           [facility]  Unless otherwise directed by your provider, please   contact [facility], our nurse care line   that is available for 24/7 assistance.     [phone] (toll-free)     Registered nurses in the [facility]   provide: appointment scheduling, clinical advisement, health education, and other advisory services.                  ** Verify the list of medication(s) below is accurate and up to date. Carry this with you in case of emergency.  If your medications have changed, please notify your healthcare provider.             Medication List      START taking these medications        Additional Info                      ciprofloxacin HCl 500 MG tablet   Commonly known as:  CIPRO   Quantity:  20 tablet   Refills:  0   Dose:  500 mg    Instructions:  Take 1 tablet (500 mg total) by mouth every 12 (twelve) hours.     Begin Date    AM    Noon    PM    Bedtime       metronidazole 500 MG tablet   Commonly known as:  FLAGYL   Quantity:  20 tablet   Refills:  0   Dose:  500 mg    Instructions:  Take 1 tablet (500 mg total) by mouth every 12 (twelve) hours.     Begin Date    AM    Noon    PM    Bedtime       oxycodone-acetaminophen 5-325 mg per tablet   Commonly known as:  PERCOCET   Quantity:  20 tablet   Refills:  0   Dose:  1 tablet    Instructions:  Take 1 tablet by mouth every 8 (eight) hours as needed for Pain.     Begin Date    AM    Noon    PM    Bedtime         CONTINUE taking these medications        Additional Info                      cetirizine 10 MG tablet   Commonly known as:  ZYRTEC   Quantity:  30 tablet   Refills:  0   Dose:  10 mg    Instructions:  Take 1 tablet (10 mg total) by mouth once daily.     Begin Date    AM    Noon    PM    Bedtime       fluticasone 50 mcg/actuation nasal spray   Commonly known as:  FLONASE   Quantity:  15 g   Refills:  0   Dose:  1 spray    Instructions:  1 spray by Each Nare route 2 (two) times daily as needed for Rhinitis.     Begin Date    AM    Noon    PM    Bedtime            Where to Get Your Medications      You can get these medications from any pharmacy     Bring a paper prescription for each of these medications     ciprofloxacin HCl 500 MG tablet    metronidazole 500 MG tablet    oxycodone-acetaminophen 5-325 mg per tablet                  Please bring to all follow up appointments:    1. A copy of your discharge instructions.  2. All medicines you are currently taking in their original  bottles.  3. Identification and insurance card.    Please arrive 15 minutes ahead of scheduled appointment time.    Please call 24 hours in advance if you must reschedule your appointment and/or time.        Follow-up Information     Follow up with Princess Arana NP On 5/23/2017.    Specialty:  Family Medicine    Why:  Outpatient Services, PCP follow-up appointment. Patient should arrive by 4:00PM.     Contact information:    [address] [place] [id number] [phone]          Discharge Instructions     Future Orders    Activity as tolerated     Diet general     Questions:    Total calories:      Fat restriction, if any:      Protein restriction, if any:      Na restriction, if any:      Fluid restriction:      Additional restrictions:          Primary Diagnosis     Your primary diagnosis was:  Inflammation Of Small Intestine And Colon      Admission Information     Date & Time Provider Department CSN    5/15/2017  4:09 PM Janak Lucia MD [facility] [id number]      Care Providers     Provider Role Specialty Primary office phone    Janak Lucia MD Attending Provider Hospitalist [phone]      Your Vitals Were     BP Pulse Temp Resp Height Weight    133/79 61 98.2 °F (36.8 °C) (Oral) 17 5' (1.524 m) 83.9 kg (185 lb)    Last Period SpO2 BMI          05/12/2017 97% 36.13 kg/m2        Recent Lab Values     No lab values to display.      Pending Labs     Order Current Status    Blood Culture #1 **CANNOT BE ORDERED STAT** Preliminary result    Blood Culture #2 **CANNOT BE ORDERED STAT** Preliminary result      Allergies as of 5/17/2017     No Known Allergies      Advance Directives     An advance directive is a document which, in the event you are no longer able to make decisions for yourself, tells your healthcare team what kind of treatment you do or do not want to receive, or who you would like to make those decisions for you.  If you do not currently have an advance  directive, [facility] encourages you to create one.  For more information call:  [phone]), [phone] ([phone]),  or log on to [email or web address].        Smoking Cessation     If you would like to quit smoking:   You may be eligible for free services if you are a [place] resident and started smoking cigarettes before September 1, 1988.  Call the Smoking [employer]essation [employer] ([employer]) toll free at (215) 013-1558 or (515) 854-3499.   Call 6-045-QUIT-NOW if you do not meet the above criteria.   Contact us via email: [email or web address]   View our website for more information: [email or web address]        Language Assistance Services     ATTENTION: Language assistance services are available, free of charge. Please call [phone].      ATENCIÓN: Si habla español, tiene a bolton disposición servicios gratuitos de asistencia lingüística. Llame al [phone].     CHÚ Ý: N?u b?n nói Ti?ng Vi?t, có các d?ch v? h? tr? ngôn ng? mi?n phí dành cho b?n. G?i s? [phone].         [facility] complies with applicable Federal civil rights laws and does not discriminate on the basis of race, color, national origin, age, disability, or sex.

## 2017-05-16 PROBLEM — A41.9 SEPSIS: Status: ACTIVE | Noted: 2017-05-16

## 2017-05-16 LAB
BASOPHILS # BLD AUTO: 0.02 K/UL
BASOPHILS NFR BLD: 0.1 %
DIFFERENTIAL METHOD: ABNORMAL
EOSINOPHIL # BLD AUTO: 0.1 K/UL
EOSINOPHIL NFR BLD: 0.7 %
ERYTHROCYTE [DISTWIDTH] IN BLOOD BY AUTOMATED COUNT: 13.8 %
HCT VFR BLD AUTO: 34.9 %
HGB BLD-MCNC: 11.7 G/DL
LYMPHOCYTES # BLD AUTO: 2.3 K/UL
LYMPHOCYTES NFR BLD: 11.6 %
MAGNESIUM SERPL-MCNC: 1.8 MG/DL
MCH RBC QN AUTO: 28.9 PG
MCHC RBC AUTO-ENTMCNC: 33.5 %
MCV RBC AUTO: 86 FL
MONOCYTES # BLD AUTO: 2.3 K/UL
MONOCYTES NFR BLD: 11.5 %
NEUTROPHILS # BLD AUTO: 15.1 K/UL
NEUTROPHILS NFR BLD: 76.1 %
PHOSPHATE SERPL-MCNC: 3.1 MG/DL
PLATELET # BLD AUTO: 256 K/UL
PMV BLD AUTO: 11.1 FL
RBC # BLD AUTO: 4.05 M/UL
WBC # BLD AUTO: 19.94 K/UL

## 2017-05-16 PROCEDURE — 63600175 PHARM REV CODE 636 W HCPCS: Performed by: HOSPITALIST

## 2017-05-16 PROCEDURE — 84100 ASSAY OF PHOSPHORUS: CPT

## 2017-05-16 PROCEDURE — 25000003 PHARM REV CODE 250: Performed by: HOSPITALIST

## 2017-05-16 PROCEDURE — 85025 COMPLETE CBC W/AUTO DIFF WBC: CPT

## 2017-05-16 PROCEDURE — 63600175 PHARM REV CODE 636 W HCPCS: Performed by: NURSE PRACTITIONER

## 2017-05-16 PROCEDURE — 83735 ASSAY OF MAGNESIUM: CPT

## 2017-05-16 PROCEDURE — 94761 N-INVAS EAR/PLS OXIMETRY MLT: CPT

## 2017-05-16 PROCEDURE — 25000003 PHARM REV CODE 250: Performed by: NURSE PRACTITIONER

## 2017-05-16 PROCEDURE — 36415 COLL VENOUS BLD VENIPUNCTURE: CPT

## 2017-05-16 PROCEDURE — 11000001 HC ACUTE MED/SURG PRIVATE ROOM

## 2017-05-16 RX ORDER — ENOXAPARIN SODIUM 100 MG/ML
40 INJECTION SUBCUTANEOUS EVERY 24 HOURS
Status: DISCONTINUED | OUTPATIENT
Start: 2017-05-16 | End: 2017-05-17 | Stop reason: HOSPADM

## 2017-05-16 RX ORDER — HYDROMORPHONE HYDROCHLORIDE 2 MG/ML
1 INJECTION, SOLUTION INTRAMUSCULAR; INTRAVENOUS; SUBCUTANEOUS
Status: DISCONTINUED | OUTPATIENT
Start: 2017-05-16 | End: 2017-05-17 | Stop reason: HOSPADM

## 2017-05-16 RX ADMIN — CIPROFLOXACIN 400 MG: 2 INJECTION, SOLUTION INTRAVENOUS at 10:05

## 2017-05-16 RX ADMIN — METRONIDAZOLE 500 MG: 500 INJECTION, SOLUTION INTRAVENOUS at 09:05

## 2017-05-16 RX ADMIN — CIPROFLOXACIN 400 MG: 2 INJECTION, SOLUTION INTRAVENOUS at 08:05

## 2017-05-16 RX ADMIN — HYDROMORPHONE HYDROCHLORIDE 1 MG: 2 INJECTION INTRAMUSCULAR; INTRAVENOUS; SUBCUTANEOUS at 09:05

## 2017-05-16 RX ADMIN — SODIUM CHLORIDE, PRESERVATIVE FREE 3 ML: 5 INJECTION INTRAVENOUS at 02:05

## 2017-05-16 RX ADMIN — METRONIDAZOLE 500 MG: 500 INJECTION, SOLUTION INTRAVENOUS at 11:05

## 2017-05-16 RX ADMIN — HYDROMORPHONE HYDROCHLORIDE 0.5 MG: 2 INJECTION INTRAMUSCULAR; INTRAVENOUS; SUBCUTANEOUS at 11:05

## 2017-05-16 RX ADMIN — HYDROMORPHONE HYDROCHLORIDE 1 MG: 2 INJECTION INTRAMUSCULAR; INTRAVENOUS; SUBCUTANEOUS at 05:05

## 2017-05-16 RX ADMIN — HYDROMORPHONE HYDROCHLORIDE 0.5 MG: 2 INJECTION INTRAMUSCULAR; INTRAVENOUS; SUBCUTANEOUS at 06:05

## 2017-05-16 RX ADMIN — ONDANSETRON 4 MG: 2 INJECTION INTRAMUSCULAR; INTRAVENOUS at 09:05

## 2017-05-16 RX ADMIN — SODIUM CHLORIDE: 0.9 INJECTION, SOLUTION INTRAVENOUS at 09:05

## 2017-05-16 RX ADMIN — HYDROMORPHONE HYDROCHLORIDE 0.5 MG: 2 INJECTION INTRAMUSCULAR; INTRAVENOUS; SUBCUTANEOUS at 03:05

## 2017-05-16 RX ADMIN — HYDROMORPHONE HYDROCHLORIDE 0.5 MG: 2 INJECTION INTRAMUSCULAR; INTRAVENOUS; SUBCUTANEOUS at 09:05

## 2017-05-16 RX ADMIN — HYDROMORPHONE HYDROCHLORIDE 1 MG: 2 INJECTION INTRAMUSCULAR; INTRAVENOUS; SUBCUTANEOUS at 02:05

## 2017-05-16 RX ADMIN — ONDANSETRON 4 MG: 2 INJECTION INTRAMUSCULAR; INTRAVENOUS at 10:05

## 2017-05-16 RX ADMIN — ENOXAPARIN SODIUM 40 MG: 100 INJECTION SUBCUTANEOUS at 05:05

## 2017-05-16 RX ADMIN — METRONIDAZOLE 500 MG: 500 INJECTION, SOLUTION INTRAVENOUS at 03:05

## 2017-05-16 NOTE — PLAN OF CARE
Problem: Patient Care Overview  Goal: Plan of Care Review  Outcome: Ongoing (interventions implemented as appropriate)  Pt remains free of falls and injuries. Pt remains NPO. IVFs and abx cont as scheduled. Pain managed with  PRN Dilaudid. VSSAF. Pt ambulates independently, will cont to monitor.

## 2017-05-16 NOTE — PROGRESS NOTES
Pt arrived to [facility] floor via transport. NAD noted. Pt oriented to room, bed in low position, call light within reach. IVFs initiated. Pt remains NPO. VSSAF. POC d/w pt. C/o abd pain. No other complaints, will cont to monitor.

## 2017-05-16 NOTE — ASSESSMENT & PLAN NOTE
· As evidence by history, physical exam, and CT imaging. Neutrophilic leukocytosis.  Infectious versus inflammatory etiology most likely.  · Blood Cultures obtained  · IV antibiotics initiated including Cipro and metronidazole  · IV fluids  · Was on Bowel rest  · GI consult  · Pain is improved,will try clear liquid diet.

## 2017-05-16 NOTE — ASSESSMENT & PLAN NOTE
With fever,leucocytosis,tachycardia,source enterocolitis,continue with IVF and  IV Abx,follow cultures.

## 2017-05-16 NOTE — H&P
[facility] Hospital Medicine  History & Physical    Patient Name: Janna Jacobs  MRN: [id number]  Admission Date: 05/15/2017  Attending Physician: Zander Cervantes MD, MPH      PCP:     Princess Arana NP    CC:     Chief Complaint   Patient presents with    Abdominal Pain     lower abdominal pain x 3 days with subjective fever/chills, nausea, emesis (3 in last 24hrs), diarrhea (4 in last 24 hrs)    Fever    Emesis    Diarrhea       HISTORY OF PRESENT ILLNESS:     Janna Jacobs is a 47 y.o. female that (in part)  has a past medical history of Former smoker and GERD (gastroesophageal reflux disease). Presents to [facility] Emergency Department complaining of abdominal pain as described below in detail.  Denies history of autoimmune disorder, irritable bowel syndrome, or Crohn's disease.  No recent travel or sick contacts.     Description of symptoms    Location:  Lower abdomen; bilateral lower quadrants   Onset:  Subacute onset 3 days ago  Character:  Aching cramping pain  Frequency:  Intermittent with waves of intensity  Duration:  Constant  Associated Symptoms:  Intermittent diarrhea and constipation.  Nausea with vomiting.  Denies hematemesis, melena or hematochezia  Radiation:  Throughout abdomen and rectum  Exacerbating factors:  None  Relieving factors:  No relieving factors     In the emergency department routine laboratory studies and CT imaging of the abdomen and pelvis were obtained.  There is evidence of acute enterocolitis.  Blood cultures were obtained.  Patient was then started on IV antibiotics with ciprofloxacin and metronidazole.  IV fluids initiated. Hospital medicine has been asked to admit for further evaluation and treatment, including a consultation to gastroenterology.       REVIEW OF SYSTEMS:     -- Constitutional: Positive for fever fever and generalized malaise  -- Eyes: No visual changes, diplopia, pain, tearing, blind spots, or  discharge.   -- Ears, nose, mouth, throat, and face: No congestion, sore throat, epistaxis, d/c, bleeding gums, neck stiffness masses, or dental issues.  -- Respiratory: No cough, shortness of breath, hemoptysis, stridor, wheezing, or night sweats.  -- Cardiovascular: No chest pain, COLES, syncope, PND, edema, cyanosis, or palpitations.   -- Gastrointestinal: As above in history of present illness  -- Genitourinary: No hematuria, dysuria, frequency, urgency, nocturia, polyuria, stones, or incontinence.  -- Integument/breast: No rash, pruritis, pigmentation changes, dryness, or changes in hair  -- Hematologic/lymphatic: No easy bruising or lymphadenopathy.   -- Musculoskeletal: No acute arthralgias, acute myalgias, joint swelling, acute limitations of ROM, or acute muscular weakness.  -- Neurological: No seizures, headaches, incoordination, paraesthesias, ataxia, vertigo, or tremors.  -- Behavioral/Psych: No auditory or visual hallucinations, depression, or suicidal/homicidal ideations.  -- Endocrine: No heat or cold intolerance, polydipsia, or unintentional weight gain / loss.  -- Allergy/Immunologic: No recurrent infections or adverse reaction to food, insects, or difficulty breathing.    Pain Scale  5-6 on scale of 1 to 10    PAST MEDICAL / SURGICAL HISTORY:     Past Medical History:   Diagnosis Date    Former smoker     GERD (gastroesophageal reflux disease)      Past Surgical History:   Procedure Laterality Date    FINGER EXPLORATION      TUBAL LIGATION           FAMILY HISTORY:     History reviewed. No pertinent family history.      SOCIAL HISTORY:     Social History   Substance Use Topics    Smoking status: Former Smoker     Types: Cigarettes    Smokeless tobacco: None    Alcohol use Yes      Comment: occ     Social History     Social History    Marital status: Legally      Spouse name: N/A    Number of children: N/A    Years of education: N/A     Social History Main Topics    Smoking status:  Former Smoker     Types: Cigarettes    Smokeless tobacco: None    Alcohol use Yes      Comment: occ    Drug use: No    Sexual activity: Yes     Birth control/ protection: None     Other Topics Concern    None     Social History Narrative         ALLERGIES:       Review of patient's allergies indicates:  No Known Allergies        HOME MEDICATIONS:     Prior to Admission medications    Medication Sig Start Date End Date Taking? Authorizing Provider   cetirizine (ZYRTEC) 10 MG tablet Take 1 tablet (10 mg total) by mouth once daily. 3/21/17 3/21/18  Ning Emery MD   fluticasone (FLONASE) 50 mcg/actuation nasal spray 1 spray by Each Nare route 2 (two) times daily as needed for Rhinitis. 3/21/17   Ning Emery MD         HOSPITAL MEDICATIONS:     Scheduled Meds:   [START ON 5/16/2017] ciprofloxacin (CIPRO)400mg/200ml D5W IVPB  400 mg Intravenous Q12H    [START ON 5/16/2017] metronidazole  500 mg Intravenous Q8H     Continuous Infusions:   sodium chloride 0.9% 125 mL/hr at 05/15/17 2156     PRN Meds:.acetaminophen, HYDROmorphone, ondansetron      PHYSICAL EXAM:     Wt Readings from Last 1 Encounters:   05/15/17 1557 83.9 kg (185 lb)     Body mass index is 36.13 kg/(m^2).  Vitals:    05/15/17 1557 05/15/17 1904 05/15/17 2057 05/15/17 2204   BP: 130/85 130/72 121/74 (!) 105/53   BP Location: Right arm Left arm Left arm Left arm   Patient Position: Sitting Sitting Sitting Lying   BP Method:  Automatic Automatic Automatic   Pulse: (!) 121 100 93 86   Resp: 20 20 18 18   Temp: (!) 100.7 °F (38.2 °C) 98.6 °F (37 °C) 98.8 °F (37.1 °C) 98.7 °F (37.1 °C)   TempSrc: Oral Oral Oral Oral   SpO2: 98% 95% 98% 97%   Weight: 83.9 kg (185 lb)      Height: 5' (1.524 m)             -- General appearance: Uncomfortable appearing.  Lying in bed.  Holding lower abdomen.  well developed. appears stated age   -- Head: normocephalic, atraumatic   -- Eyes: conjunctivae clear. Extraocular muscles intact  -- Nose: Nares normal.  Septum midline.   -- Mouth/Throat: lips, mucosa, and tongue normal. no throat erythema.   -- Neck: supple, symmetrical, trachea midline, no JVD and thyroid not grossly enlarged, appears symmetric  -- Lungs: clear to auscultation bilaterally. normal respiratory effort. No use of accessory muscles.   -- Chest wall: no tenderness. equal bilateral chest rise   -- Heart: Rapid  rate and  regular rhythm. S1, S2 normal.  no click, rub or gallop   -- Abdomen: Diffuse lower abdominal pain with slight guarding.  Hypoactive bowel sounds.  soft, non-distended, non-tympanic  -- Extremities: no cyanosis, clubbing or edema.   -- Pulses: 2+ and symmetric   -- Skin: color normal, texture normal, turgor normal. No rashes or lesions.   -- Neurologic: Normal strength and tone. No focal numbness or weakness. CNII-XII intact. [place] coma scale: eyes open spontaneously-4, oriented & converses-5, obeys commands-6.      LABORATORY STUDIES:     Recent Results (from the past 36 hour(s))   CBC W/ AUTO DIFFERENTIAL    Collection Time: 05/15/17  4:50 PM   Result Value Ref Range    WBC 20.74 (H) 3.90 - 12.70 K/uL    RBC 4.66 4.00 - 5.40 M/uL    Hemoglobin 13.7 12.0 - 16.0 g/dL    Hematocrit 40.0 37.0 - 48.5 %    MCV 86 82 - 98 fL    MCH 29.4 27.0 - 31.0 pg    MCHC 34.3 32.0 - 36.0 %    RDW 14.0 11.5 - 14.5 %    Platelets 299 150 - 350 K/uL    MPV 10.7 9.2 - 12.9 fL    Gran # 15.6 (H) 1.8 - 7.7 K/uL    Lymph # 2.9 1.0 - 4.8 K/uL    Mono # 2.2 (H) 0.3 - 1.0 K/uL    Eos # 0.1 0.0 - 0.5 K/uL    Baso # 0.03 0.00 - 0.20 K/uL    Gran% 75.4 (H) 38.0 - 73.0 %    Lymph% 14.1 (L) 18.0 - 48.0 %    Mono% 10.6 4.0 - 15.0 %    Eosinophil% 0.2 0.0 - 8.0 %    Basophil% 0.1 0.0 - 1.9 %    Differential Method Automated    Comp. Metabolic Panel    Collection Time: 05/15/17  4:50 PM   Result Value Ref Range    Sodium 134 (L) 136 - 145 mmol/L    Potassium 4.0 3.5 - 5.1 mmol/L    Chloride 100 95 - 110 mmol/L    CO2 23 23 - 29 mmol/L    Glucose 96 70 - 110 mg/dL     BUN, Bld 5 (L) 6 - 20 mg/dL    Creatinine 0.8 0.5 - 1.4 mg/dL    Calcium 9.8 8.7 - 10.5 mg/dL    Total Protein 8.0 6.0 - 8.4 g/dL    Albumin 3.5 3.5 - 5.2 g/dL    Total Bilirubin 0.5 0.1 - 1.0 mg/dL    Alkaline Phosphatase 59 55 - 135 U/L    AST 16 10 - 40 U/L    ALT 10 10 - 44 U/L    Anion Gap 11 8 - 16 mmol/L    eGFR if African American >60 >60 mL/min/1.73 m^2    eGFR if non African American >60 >60 mL/min/1.73 m^2   Lipase    Collection Time: 05/15/17  4:50 PM   Result Value Ref Range    Lipase 8 4 - 60 U/L   POCT urine pregnancy    Collection Time: 05/15/17  5:02 PM   Result Value Ref Range    POC Preg Test, Ur Negative Negative     Acceptable Yes    Urinalysis - Clean Catch    Collection Time: 05/15/17  5:03 PM   Result Value Ref Range    Specimen UA Urine, Clean Catch     Color, UA Yellow Yellow, Straw, Barb    Appearance, UA Clear Clear    pH, UA 6.0 5.0 - 8.0    Specific Gravity, UA 1.015 1.005 - 1.030    Protein, UA Negative Negative    Glucose, UA Negative Negative    Ketones, UA Negative Negative    Bilirubin (UA) Negative Negative    Occult Blood UA 3+ (A) Negative    Nitrite, UA Negative Negative    Urobilinogen, UA 4.0-6.0 (A) <2.0 EU/dL    Leukocytes, UA Negative Negative   Urinalysis Microscopic    Collection Time: 05/15/17  5:03 PM   Result Value Ref Range    RBC, UA 3 0 - 4 /hpf    WBC, UA 0 0 - 5 /hpf    Bacteria, UA None None-Occ /hpf    Squam Epithel, UA 8 /hpf    Microscopic Comment SEE COMMENT    Lactic acid, plasma    Collection Time: 05/15/17  7:33 PM   Result Value Ref Range    Lactate (Lactic Acid) 1.3 0.5 - 2.2 mmol/L       No results found for: INR, PROTIME  No results found for: HGBA1C  No results for input(s): POCTGLUCOSE in the last 72 hours.        MICROBIOLOGY DATA:     No results found for: LABGENI, LABREFE, LABUPPE, LABURIN, LABAFB    Microbiology x 7d:   Microbiology Results (last 7 days)     Procedure Component Value Units Date/Time    Blood Culture #1 **CANNOT  BE ORDERED STAT** [[id number]] Collected:  05/15/17 1933    Order Status:  Sent Specimen:  Blood from Peripheral, Hand, Right Updated:  05/15/17 1946    Blood Culture #2 **CANNOT BE ORDERED STAT** [[id number]] Collected:  05/15/17 1938    Order Status:  Sent Specimen:  Blood from Peripheral, Hand, Left Updated:  05/15/17 1945            IMAGING:     Imaging Results         CT Abdomen Pelvis With Contrast (Final result)    Abnormal Result time:  05/15/17 18:09:57    Final result by Umang Viera MD (05/15/17 18:09:57)    Impression:       Mild wall thickening of the small small and large bowel loops with minimal distention.  The findings may be seen with nonspecific enterocolitis (inflammation or infectious).    No CT evidence for acute appendicitis.  Suggest clinical correlation and followup.    Scattered colonic diverticula without evidence of acute diverticulitis.    Large 10 cm fibroid with possible degeneration.  Nonemergent ultrasound of the pelvis may be obtained for further evaluation.    Additional findings as above.    Report has been flagged in the EP[employer] medical record system.          Electronically signed by: UMANG VIERA MD  Date:     05/15/17  Time:    18:09     Narrative:    Exam: [id number]  05/15/17  16:36:18 [id number] ([facility]) : CT ABDOMEN PELVIS WITH CONTRAST    Technique:    Axial CT Scan of the abdomen and pelvis was performed from the lung base to the public symphysis after the intravenous administration of  100 cc of Omni 350. Coronal and Sagittal reformats were obtained. Delayed images were also obtained    Comparison:     None     Findings:      The lung bases are within normal limits.  The heart is normal in appearance.  The vessels are within normal limits.  There is no evidence of lymphadenopathy in the abdomen or pelvis.    The esophagus, stomach, and adrenal are within normal limits.  There is mild wall thickening and distention of small bowel loops.  The visualized portions of the appendix are  unremarkable.  There are no secondary findings of acute appendicitis.  There is scattered colonic diverticula without evidence of acute diverticulitis.  There is no evidence of bowel obstruction.  No significant bowel wall thickening is identified.    There is a subcentimeter low attenuation lesion in the left hepatic lobe.  The reminder of the hepatic parenchyma is unremarkable.  The gallbladder is within normal limits.  The biliary tree is unremarkable.  There are small splenules.  The pancreas and adrenal glands are within normal limits.    The kidneys and ureters are within normal limits.  The urinary bladder is unremarkable.  The uterus is enlarged.  There is a 10 x 10 cm low attenuation lesion within the uterus, most likely representing a degenerating fibroid.  The adnexal structures are within normal limits.    There is no evidence of free fluid in the abdomen or pelvis.  There is no evidence of free air.  There is no evidence of pneumatosis.    There are degenerative changes in the osseous structures.  No fractures are present.  The abdominal wall is within normal limits.                CONSULTS:     None       ASSESSMENT & PLAN:     Primary Diagnosis:  Enterocolitis    Active Hospital Problems    Diagnosis  POA    *Enterocolitis [K52.9]  Yes     Priority: 1 - High    Neutrophilic leukocytosis [D72.9]  Yes     Priority: 2     GERD (gastroesophageal reflux disease) [K21.9]  Yes      Resolved Hospital Problems    Diagnosis Date Resolved POA    Viral URI with cough [J06.9, B97.89] 05/15/2017 Yes         Enterocolitis  · As evidence by history, physical exam, and CT imaging. Neutrophilic leukocytosis.  Infectious versus inflammatory etiology most likely.  · Blood Cultures obtained  · IV antibiotics initiated including Cipro and metronidazole  · IV fluids  · Bowel rest  · GI consult      Neutrophilic leukocytosis  Secondary to acute enterocolitis as noted above.      Viral URI with cough        GERD  (gastroesophageal reflux disease)  Protonix IV while NPO        VTE Risk Mitigation         Ordered     Low Risk of VTE  Once      05/15/17 3048            Adult PRN medications available enterocolitis   DVT prophylaxis given       DISPOSITION:     Will admit to the Hospital Medicine service for further evaluation and treatment.    Chart reviewed and updated where applicable.    High Risk Conditions:  Patient has a condition that poses threat to life and bodily function: Abdominal pain with evidence of acute enterocolitis      ===============================================================    Zander Cervantes MD, MPH  Department of Hospital Medicine   [facility]  [phone] pg  (7pm - 6am)          This note is dictated using Dragon Medical 360 voice recognition software.  There are word recognition mistakes that are occasionally missed on review.

## 2017-05-16 NOTE — SUBJECTIVE & OBJECTIVE
Interval History: fever and abdominal pain is improved.    Review of Systems   Constitutional: Positive for appetite change, chills and fever. Negative for activity change.   HENT: Negative for congestion and dental problem.    Eyes: Negative for discharge and itching.   Respiratory: Negative for apnea and chest tightness.    Cardiovascular: Negative for chest pain.   Gastrointestinal: Positive for abdominal pain, diarrhea and nausea.   Endocrine: Negative for cold intolerance and heat intolerance.   Genitourinary: Negative for difficulty urinating and dyspareunia.   Musculoskeletal: Negative for arthralgias and back pain.   Neurological: Negative for dizziness and facial asymmetry.   Hematological: Negative for adenopathy. Does not bruise/bleed easily.   Psychiatric/Behavioral: Negative for agitation and behavioral problems.     Objective:     Vital Signs (Most Recent):  Temp: 98 °F (36.7 °C) (05/16/17 0812)  Pulse: 98 (05/16/17 0812)  Resp: 18 (05/16/17 0812)  BP: 128/69 (05/16/17 0812)  SpO2: 98 % (05/16/17 0850) Vital Signs (24h Range):  Temp:  [98 °F (36.7 °C)-100.7 °F (38.2 °C)] 98 °F (36.7 °C)  Pulse:  [] 98  Resp:  [18-20] 18  SpO2:  [95 %-100 %] 98 %  BP: (105-130)/(53-85) 128/69     Weight: 83.9 kg (185 lb)  Body mass index is 36.13 kg/(m^2).    Intake/Output Summary (Last 24 hours) at 05/16/17 1134  Last data filed at 05/16/17 0600   Gross per 24 hour   Intake             1100 ml   Output                0 ml   Net             1100 ml      Physical Exam   Constitutional: She is oriented to person, place, and time. No distress.   HENT:   Head: Atraumatic.   Eyes: EOM are normal. Pupils are equal, round, and reactive to light.   Neck: Normal range of motion. Neck supple.   Cardiovascular: Normal rate.    Pulmonary/Chest: Effort normal and breath sounds normal. No respiratory distress.   Abdominal: She exhibits no distension. There is tenderness.   Musculoskeletal: She exhibits no edema or deformity.    Neurological: She is oriented to person, place, and time. No cranial nerve deficit. Coordination normal.   Skin: Skin is warm and dry. She is not diaphoretic.   Psychiatric: She has a normal mood and affect. Her behavior is normal.       Significant Labs:   CBC:   Recent Labs  Lab 05/15/17  1650 05/16/17  0525   WBC 20.74* 19.94*   HGB 13.7 11.7*   HCT 40.0 34.9*    256     CMP:   Recent Labs  Lab 05/15/17  1650   *   K 4.0      CO2 23   GLU 96   BUN 5*   CREATININE 0.8   CALCIUM 9.8   PROT 8.0   ALBUMIN 3.5   BILITOT 0.5   ALKPHOS 59   AST 16   ALT 10   ANIONGAP 11   EGFRNONAA >60       Significant Imaging: reviewed

## 2017-05-16 NOTE — ASSESSMENT & PLAN NOTE
· As evidence by history, physical exam, and CT imaging. Neutrophilic leukocytosis.  Infectious versus inflammatory etiology most likely.  · Blood Cultures obtained  · IV antibiotics initiated including Cipro and metronidazole  · IV fluids  · Bowel rest  · GI consult

## 2017-05-16 NOTE — PROGRESS NOTES
[facility] Hospital Medicine  Progress Note    Patient Name: Janna Jacobs  MRN: [id number]  Patient Class: IP- Inpatient   Admission Date: 5/15/2017  Length of Stay: 1 days  Attending Physician: Janak Lucai MD  Primary Care Provider: Princess Arana NP        Subjective:     Principal Problem:Enterocolitis    HPI:  Janna Jacobs is a 47 y.o. female that (in part)  has a past medical history of Former smoker and GERD (gastroesophageal reflux disease). Presents to [facility] Emergency Department complaining of abdominal pain as described below in detail.  Denies history of autoimmune disorder, irritable bowel syndrome, or Crohn's disease.  No recent travel or sick contacts.     Description of symptoms    Location:  Lower abdomen; bilateral lower quadrants   Onset:  Subacute onset 3 days ago  Character:  Aching cramping pain  Frequency:  Intermittent with waves of intensity  Duration:  Constant  Associated Symptoms:  Intermittent diarrhea and constipation.  Nausea with vomiting.  Denies hematemesis, melena or hematochezia  Radiation:  Throughout abdomen and rectum  Exacerbating factors:  None  Relieving factors:  No relieving factors     In the emergency department routine laboratory studies and CT imaging of the abdomen and pelvis were obtained.  There is evidence of acute enterocolitis.  Blood cultures were obtained.  Patient was then started on IV antibiotics with ciprofloxacin and metronidazole.  IV fluids initiated. Hospital medicine has been asked to admit for further evaluation and treatment, including a consultation to gastroenterology.       Hospital Course:  Janna Jacobs is a 47 y.o. female that (in part)  has a past medical history of Former smoker and GERD (gastroesophageal reflux disease). Presents to [facility] Emergency Department complaining of abdominal pain.  Denies history of autoimmune disorder,  irritable bowel syndrome, or Crohn's disease.  No recent travel or sick contacts.  In the emergency department routine laboratory studies and CT imaging of the abdomen and pelvis were obtained.  There is evidence of acute enterocolitis.  Blood cultures were obtained.  Patient was then started on IV antibiotics with ciprofloxacin and metronidazole.  IV fluids initiated.he was septic with fever.leucocytosis,tachycardia,source enterocolitis,fever and Abdominal pain is improved at this time,stated on clear liquid diet.      Interval History: fever and abdominal pain is improved.    Review of Systems   Constitutional: Positive for appetite change, chills and fever. Negative for activity change.   HENT: Negative for congestion and dental problem.    Eyes: Negative for discharge and itching.   Respiratory: Negative for apnea and chest tightness.    Cardiovascular: Negative for chest pain.   Gastrointestinal: Positive for abdominal pain, diarrhea and nausea.   Endocrine: Negative for cold intolerance and heat intolerance.   Genitourinary: Negative for difficulty urinating and dyspareunia.   Musculoskeletal: Negative for arthralgias and back pain.   Neurological: Negative for dizziness and facial asymmetry.   Hematological: Negative for adenopathy. Does not bruise/bleed easily.   Psychiatric/Behavioral: Negative for agitation and behavioral problems.     Objective:     Vital Signs (Most Recent):  Temp: 98 °F (36.7 °C) (05/16/17 0812)  Pulse: 98 (05/16/17 0812)  Resp: 18 (05/16/17 0812)  BP: 128/69 (05/16/17 0812)  SpO2: 98 % (05/16/17 0850) Vital Signs (24h Range):  Temp:  [98 °F (36.7 °C)-100.7 °F (38.2 °C)] 98 °F (36.7 °C)  Pulse:  [] 98  Resp:  [18-20] 18  SpO2:  [95 %-100 %] 98 %  BP: (105-130)/(53-85) 128/69     Weight: 83.9 kg (185 lb)  Body mass index is 36.13 kg/(m^2).    Intake/Output Summary (Last 24 hours) at 05/16/17 1134  Last data filed at 05/16/17 0600   Gross per 24 hour   Intake             1100 ml    Output                0 ml   Net             1100 ml      Physical Exam   Constitutional: She is oriented to person, place, and time. No distress.   HENT:   Head: Atraumatic.   Eyes: EOM are normal. Pupils are equal, round, and reactive to light.   Neck: Normal range of motion. Neck supple.   Cardiovascular: Normal rate.    Pulmonary/Chest: Effort normal and breath sounds normal. No respiratory distress.   Abdominal: She exhibits no distension. There is tenderness.   Musculoskeletal: She exhibits no edema or deformity.   Neurological: She is oriented to person, place, and time. No cranial nerve deficit. Coordination normal.   Skin: Skin is warm and dry. She is not diaphoretic.   Psychiatric: She has a normal mood and affect. Her behavior is normal.       Significant Labs:   CBC:   Recent Labs  Lab 05/15/17  1650 05/16/17  0525   WBC 20.74* 19.94*   HGB 13.7 11.7*   HCT 40.0 34.9*    256     CMP:   Recent Labs  Lab 05/15/17  1650   *   K 4.0      CO2 23   GLU 96   BUN 5*   CREATININE 0.8   CALCIUM 9.8   PROT 8.0   ALBUMIN 3.5   BILITOT 0.5   ALKPHOS 59   AST 16   ALT 10   ANIONGAP 11   EGFRNONAA >60       Significant Imaging: reviewed    Assessment/Plan:      * Enterocolitis  · As evidence by history, physical exam, and CT imaging. Neutrophilic leukocytosis.  Infectious versus inflammatory etiology most likely.  · Blood Cultures obtained  · IV antibiotics initiated including Cipro and metronidazole  · IV fluids  · Was on Bowel rest  · GI consult  · Pain is improved,will try clear liquid diet.      GERD (gastroesophageal reflux disease)  Protonix IV     Neutrophilic leukocytosis  Secondary to acute enterocolitis as noted above.      Sepsis  With fever,leucocytosis,tachycardia,source enterocolitis,continue with IVF and  IV Abx,follow cultures.      VTE Risk Mitigation         Ordered     enoxaparin injection 40 mg  Daily     Route:  Subcutaneous        05/16/17 1128     Medium Risk of VTE  Once       05/15/17 6778          Janak Lucia MD  Department of Hospital Medicine   [facility]

## 2017-05-17 VITALS
BODY MASS INDEX: 36.32 KG/M2 | TEMPERATURE: 98 F | DIASTOLIC BLOOD PRESSURE: 79 MMHG | SYSTOLIC BLOOD PRESSURE: 133 MMHG | HEIGHT: 60 IN | HEART RATE: 61 BPM | WEIGHT: 185 LBS | RESPIRATION RATE: 17 BRPM | OXYGEN SATURATION: 97 %

## 2017-05-17 LAB
BASOPHILS # BLD AUTO: 0.02 K/UL
BASOPHILS NFR BLD: 0.1 %
DIFFERENTIAL METHOD: ABNORMAL
EOSINOPHIL # BLD AUTO: 0.1 K/UL
EOSINOPHIL NFR BLD: 0.6 %
ERYTHROCYTE [DISTWIDTH] IN BLOOD BY AUTOMATED COUNT: 13.7 %
HCT VFR BLD AUTO: 36.4 %
HGB BLD-MCNC: 12 G/DL
LYMPHOCYTES # BLD AUTO: 2.6 K/UL
LYMPHOCYTES NFR BLD: 14.1 %
MAGNESIUM SERPL-MCNC: 2.1 MG/DL
MCH RBC QN AUTO: 28.4 PG
MCHC RBC AUTO-ENTMCNC: 33 %
MCV RBC AUTO: 86 FL
MONOCYTES # BLD AUTO: 2 K/UL
MONOCYTES NFR BLD: 10.7 %
NEUTROPHILS # BLD AUTO: 13.5 K/UL
NEUTROPHILS NFR BLD: 74.3 %
PHOSPHATE SERPL-MCNC: 3.4 MG/DL
PLATELET # BLD AUTO: 320 K/UL
PMV BLD AUTO: 11.3 FL
RBC # BLD AUTO: 4.22 M/UL
WBC # BLD AUTO: 18.24 K/UL

## 2017-05-17 PROCEDURE — 63600175 PHARM REV CODE 636 W HCPCS: Performed by: NURSE PRACTITIONER

## 2017-05-17 PROCEDURE — 25000003 PHARM REV CODE 250: Performed by: NURSE PRACTITIONER

## 2017-05-17 PROCEDURE — 63600175 PHARM REV CODE 636 W HCPCS: Performed by: HOSPITALIST

## 2017-05-17 PROCEDURE — 84100 ASSAY OF PHOSPHORUS: CPT

## 2017-05-17 PROCEDURE — 85025 COMPLETE CBC W/AUTO DIFF WBC: CPT

## 2017-05-17 PROCEDURE — 83735 ASSAY OF MAGNESIUM: CPT

## 2017-05-17 PROCEDURE — 36415 COLL VENOUS BLD VENIPUNCTURE: CPT

## 2017-05-17 RX ORDER — CIPROFLOXACIN 500 MG/1
500 TABLET ORAL EVERY 12 HOURS
Qty: 20 TABLET | Refills: 0 | Status: SHIPPED | OUTPATIENT
Start: 2017-05-17 | End: 2017-05-27

## 2017-05-17 RX ORDER — METRONIDAZOLE 500 MG/100ML
500 INJECTION, SOLUTION INTRAVENOUS
Status: DISCONTINUED | OUTPATIENT
Start: 2017-05-17 | End: 2017-05-17 | Stop reason: HOSPADM

## 2017-05-17 RX ORDER — OXYCODONE AND ACETAMINOPHEN 5; 325 MG/1; MG/1
1 TABLET ORAL EVERY 8 HOURS PRN
Qty: 20 TABLET | Refills: 0 | Status: SHIPPED | OUTPATIENT
Start: 2017-05-17 | End: 2017-05-27

## 2017-05-17 RX ORDER — METRONIDAZOLE 500 MG/1
500 TABLET ORAL EVERY 12 HOURS
Qty: 20 TABLET | Refills: 0 | Status: SHIPPED | OUTPATIENT
Start: 2017-05-17 | End: 2017-05-27

## 2017-05-17 RX ADMIN — HYDROMORPHONE HYDROCHLORIDE 1 MG: 2 INJECTION INTRAMUSCULAR; INTRAVENOUS; SUBCUTANEOUS at 10:05

## 2017-05-17 RX ADMIN — HYDROMORPHONE HYDROCHLORIDE 1 MG: 2 INJECTION INTRAMUSCULAR; INTRAVENOUS; SUBCUTANEOUS at 08:05

## 2017-05-17 RX ADMIN — METRONIDAZOLE 500 MG: 500 INJECTION, SOLUTION INTRAVENOUS at 05:05

## 2017-05-17 RX ADMIN — HYDROMORPHONE HYDROCHLORIDE 1 MG: 2 INJECTION INTRAMUSCULAR; INTRAVENOUS; SUBCUTANEOUS at 05:05

## 2017-05-17 RX ADMIN — HYDROMORPHONE HYDROCHLORIDE 1 MG: 2 INJECTION INTRAMUSCULAR; INTRAVENOUS; SUBCUTANEOUS at 01:05

## 2017-05-17 RX ADMIN — CIPROFLOXACIN 400 MG: 2 INJECTION, SOLUTION INTRAVENOUS at 08:05

## 2017-05-17 NOTE — DISCHARGE SUMMARY
[facility] Hospital Medicine  Discharge Summary      Patient Name: Janna Jacobs  MRN: [id number]  Admission Date: 5/15/2017  Hospital Length of Stay: 2 days  Discharge Date and Time:  05/17/2017 9:49 AM  Attending Physician: Janak Lucia MD   Discharging Provider: Janak Lucia MD  Primary Care Provider: Princess Arana NP      HPI:   Janna Jacobs is a 47 y.o. female that (in part)  has a past medical history of Former smoker and GERD (gastroesophageal reflux disease). Presents to [facility] Emergency Department complaining of abdominal pain as described below in detail.  Denies history of autoimmune disorder, irritable bowel syndrome, or Crohn's disease.  No recent travel or sick contacts.     Description of symptoms    Location:  Lower abdomen; bilateral lower quadrants   Onset:  Subacute onset 3 days ago  Character:  Aching cramping pain  Frequency:  Intermittent with waves of intensity  Duration:  Constant  Associated Symptoms:  Intermittent diarrhea and constipation.  Nausea with vomiting.  Denies hematemesis, melena or hematochezia  Radiation:  Throughout abdomen and rectum  Exacerbating factors:  None  Relieving factors:  No relieving factors     In the emergency department routine laboratory studies and CT imaging of the abdomen and pelvis were obtained.  There is evidence of acute enterocolitis.  Blood cultures were obtained.  Patient was then started on IV antibiotics with ciprofloxacin and metronidazole.  IV fluids initiated. Hospital medicine has been asked to admit for further evaluation and treatment, including a consultation to gastroenterology.       * No surgery found *      Indwelling Lines/Drains at time of discharge:   Lines/Drains/Airways          No matching active lines, drains, or airways        Hospital Course:   Janna Jacobs is a 47 y.o. female that (in part)  has a past medical history of Former smoker and  GERD (gastroesophageal reflux disease). Presents to [facility] Emergency Department complaining of abdominal pain.  Denies history of autoimmune disorder, irritable bowel syndrome, or Crohn's disease.  No recent travel or sick contacts.  In the emergency department routine laboratory studies and CT imaging of the abdomen and pelvis were obtained.  There is evidence of acute enterocolitis.  Blood cultures were obtained.  Patient was then started on IV antibiotics with ciprofloxacin and metronidazole.  IV fluids initiated.he was septic with fever.leucocytosis,tachycardia,source enterocolitis,fever and Abdominal pain is much improved,bllod culture was negative,patient tolerated diet,she has been consulted out patient follow up with PCP for colonoscopy after finishing PO Abx for 10 days with Cipro and flagyl.       Consults:     Significant Diagnostic Studies: Labs:   BMP:   Recent Labs  Lab 05/15/17  1650 05/16/17  0525 05/17/17  0545   GLU 96  --   --    *  --   --    K 4.0  --   --      --   --    CO2 23  --   --    BUN 5*  --   --    CREATININE 0.8  --   --    CALCIUM 9.8  --   --    MG  --  1.8 2.1    and CBC   Recent Labs  Lab 05/15/17  1650 05/16/17  0525 05/17/17  0545   WBC 20.74* 19.94* 18.24*   HGB 13.7 11.7* 12.0   HCT 40.0 34.9* 36.4*    256 320     Microbiology:   Blood Culture   Lab Results   Component Value Date    LABBLOO No Growth to date 05/15/2017    LABBLOO No Growth to date 05/15/2017     Radiology: CT scan: CT ABDOMEN PELVIS WITH CONTRAST:   Results for orders placed or performed during the hospital encounter of 05/15/17   CT Abdomen Pelvis With Contrast    Narrative    Exam: [id number]  05/15/17  16:36:18 [id number] ([facility]) : CT ABDOMEN PELVIS WITH CONTRAST    Technique:    Axial CT Scan of the abdomen and pelvis was performed from the lung base to the public symphysis after the intravenous administration of  100 cc of Omni 350. Coronal and Sagittal reformats  were obtained. Delayed images were also obtained    Comparison:     None     Findings:      The lung bases are within normal limits.  The heart is normal in appearance.  The vessels are within normal limits.  There is no evidence of lymphadenopathy in the abdomen or pelvis.    The esophagus, stomach, and adrenal are within normal limits.  There is mild wall thickening and distention of small bowel loops.  The visualized portions of the appendix are unremarkable.  There are no secondary findings of acute appendicitis.  There is scattered colonic diverticula without evidence of acute diverticulitis.  There is no evidence of bowel obstruction.  No significant bowel wall thickening is identified.    There is a subcentimeter low attenuation lesion in the left hepatic lobe.  The reminder of the hepatic parenchyma is unremarkable.  The gallbladder is within normal limits.  The biliary tree is unremarkable.  There are small splenules.  The pancreas and adrenal glands are within normal limits.    The kidneys and ureters are within normal limits.  The urinary bladder is unremarkable.  The uterus is enlarged.  There is a 10 x 10 cm low attenuation lesion within the uterus, most likely representing a degenerating fibroid.  The adnexal structures are within normal limits.    There is no evidence of free fluid in the abdomen or pelvis.  There is no evidence of free air.  There is no evidence of pneumatosis.    There are degenerative changes in the osseous structures.  No fractures are present.  The abdominal wall is within normal limits.    Impression       Mild wall thickening of the small small and large bowel loops with minimal distention.  The findings may be seen with nonspecific enterocolitis (inflammation or infectious).    No CT evidence for acute appendicitis.  Suggest clinical correlation and followup.    Scattered colonic diverticula without evidence of acute diverticulitis.    Large 10 cm fibroid with possible  degeneration.  Nonemergent ultrasound of the pelvis may be obtained for further evaluation.    Additional findings as above.    Report has been flagged in the EPIC medical record system.          Electronically signed by: SANDHYA CELAYA MD  Date:     05/15/17  Time:    18:09        Pending Diagnostic Studies:     None        Final Active Diagnoses:    Diagnosis Date Noted POA    PRINCIPAL PROBLEM:  Enterocolitis [K52.9] 05/15/2017 Yes    Sepsis [A41.9] 05/16/2017 Yes    Neutrophilic leukocytosis [D72.9] 05/15/2017 Yes    GERD (gastroesophageal reflux disease) [K21.9]  Yes      Problems Resolved During this Admission:    Diagnosis Date Noted Date Resolved POA    Viral URI with cough [J06.9, B97.89]  05/15/2017 Yes      No new Assessment & Plan notes have been filed under this hospital service since the last note was generated.  Service: Hospital Medicine      Discharged Condition: stable    Disposition: Home or Self Care    Follow Up:  Follow-up Information     Follow up with Princess Arana NP In 5 days.    Specialty:  Family Medicine    Contact information:    [address] [place] [id number] [phone]          Patient Instructions:     Diet general     Activity as tolerated       Medications:  Reconciled Home Medications:   Current Discharge Medication List      START taking these medications    Details   ciprofloxacin HCl (CIPRO) 500 MG tablet Take 1 tablet (500 mg total) by mouth every 12 (twelve) hours.  Qty: 20 tablet, Refills: 0      metronidazole (FLAGYL) 500 MG tablet Take 1 tablet (500 mg total) by mouth every 12 (twelve) hours.  Qty: 20 tablet, Refills: 0      oxycodone-acetaminophen (PERCOCET) 5-325 mg per tablet Take 1 tablet by mouth every 8 (eight) hours as needed for Pain.  Qty: 20 tablet, Refills: 0         CONTINUE these medications which have NOT CHANGED    Details   cetirizine (ZYRTEC) 10 MG tablet Take 1 tablet (10 mg total) by mouth once daily.  Qty: 30 tablet, Refills: 0       fluticasone (FLONASE) 50 mcg/actuation nasal spray 1 spray by Each Nare route 2 (two) times daily as needed for Rhinitis.  Qty: 15 g, Refills: 0           Time spent on the discharge of patient: 30  minutes    Janak Lucia MD  Department of Hospital Medicine  [facility]

## 2017-05-17 NOTE — NURSING
Patient discharged to home, patient is ambulatory and so apparent distress, pain or SOB.  Patient's IV removed due to infiltration, final dose of recommended flagyl not given, MD aware and ok with not giving one final IV antibiotic.  Patient given paper Rx's, copies placed in chart.  Patient ambulated to daughter's car, work note signed and given to patient per patient request.

## 2017-05-17 NOTE — PROGRESS NOTES
Follow-up Information     Follow up with Princess Arana NP On 5/23/2017.    Specialty:  Family Medicine    Why:  Outpatient Services, PCP follow-up appointment. Patient should arrive by 4:00PM.     Contact information:    [place] [id number]  [phone]        PLEASE BRING TO ALL FOLLOW UP APPOINTMENTS:   A COPY YOUR DISCHARGE INSTRUCTIONS, Any new MEDICINES YOU ARE CURRENTLY TAKING IN THEIR ORIGINAL BOTTLES  And IDENTIFICATION AND INSURANCE CARD     **PLEASE ARRIVE 15 MINUTES AHEAD OF SCHEDULED APPOINTMENT TIME   ++PLEASE CALL 24 HOURS IN ADVANCE IF YOU MUST RESCHEDULE YOUR APPOINTMENT DAY AND/OR TIME     Help at Home [phone]  After discharge for assistance [facility] On Call Nurse Care Line 24/7 Assistance     Things You are responsible For To Manage Your Care At Home:  1.    Getting your prescriptions filled   2.    Taking your medications as directed, DO NOT MISS ANY DOSES!  3.    Going to your follow-up doctor appointment. This is important because it  allow the doctor to monitor your progress and determine if  any changes need to made to your treatment plan.     Thank you for choosing [facility] for your care.  Please answer any calls you may receive from [facility] we want to continue to support you as you manage your healthcare needs. [facility] is happy to have the opportunity to serve you.      BALDO Zapata, MSW, CSW  [phone]  Care Management

## 2017-05-17 NOTE — PLAN OF CARE
05/17/17 0836   Discharge Assessment   Assessment Type Discharge Planning Assessment   Confirmed/corrected address and phone number on facesheet? Yes   Assessment information obtained from? Patient   Prior to hospitilization cognitive status: Alert/Oriented   Prior to hospitalization functional status: Independent   Current cognitive status: Alert/Oriented   Current Functional Status: Independent   Arrived From admitted as an inpatient;home or self-care   Lives With alone   Able to Return to Prior Arrangements yes   Is patient able to care for self after discharge? Yes   How many people do you have in your home that can help with your care after discharge? 1   Who are your caregiver(s) and their phone number(s)? Hilaria pt daughter [phone]   Patient's perception of discharge disposition admitted as an inpatient;home or selfcare   Readmission Within The Last 30 Days no previous admission in last 30 days   Patient currently being followed by outpatient case management? No   Patient currently receives home health services? No   Does the patient currently use HME? No   Patient currently receives private duty nursing? No   Patient currently receives any other outside agency services? No   Equipment Currently Used at Home none   Do you have any problems affording any of your prescribed medications? No   Is the patient taking medications as prescribed? (Per pt she is currently not on any meds)   Does the patient have transportation to healthcare appointments? Yes   On Dialysis? No   Does the patient receive services at the Coumadin Clinic? No   Are there any open cases? No   Discharge Plan A Home   Patient/Family In Agreement With Plan yes

## 2017-05-17 NOTE — PLAN OF CARE
Problem: Patient Care Overview  Goal: Plan of Care Review  Outcome: Ongoing (interventions implemented as appropriate)  Patient tolerated clear liquids well today.  She denies nausea and reports improved pain control with adjustments to PRN pain management plan and states that heat packs applied to R abd provide relief as well.  She calls out appropriately for assistance with ambulation to and from the bathroom. VS stable. Will continue to monitor.

## 2017-05-17 NOTE — PLAN OF CARE
05/17/17 1019   Final Note   Assessment Type Final Discharge Note   Discharge Disposition Home   Discharge planning education complete? Yes   What phone number can be called within the next 1-3 days to see how you are doing after discharge? [phone]   Hospital Follow Up  Appt(s) scheduled? Yes   Discharge plans and expectations educations in teach back method with documentation complete? Yes   Offered [facility] Bedside Delivery? n/a   Discharge/Hospital Encounter Summary to ([facility]) PCP n/a   Referral to Outpatient Case Management complete? n/a   Referral to / orders for Home Health Complete? n/a   30 day supply of medicines given at discharge, if documented non-compliance / non-adherence? n/a   Any social issues identified prior to discharge? No   Did you assess the readiness or willingness of the family or caregiver to support self management of care? Yes     ÓSCAR provided pt with a copy of follow-up appointments. SW explained/highlighted date, time, and location of each appointment. ÓSCAR provided pt with a blue folder and instructed pt to place all medical documents in blue folder. ÓSCAR explained to pt the nurse will provide an AVS with diagnosis and instructed pt to place in blue folder and bring to follow-up appointment. SW informed pt to bring ID, insurance card, and AVS to follow-up appointment. ÓSCAR informed pt of the importance of going to follow-up appointment as she has canceled all her previous appointments. Stomach teaching completed by pt nurse Phan. ÓSCAR informed pt nurse Phan all CM need have been met.

## 2017-05-17 NOTE — PLAN OF CARE
Problem: Patient Care Overview  Goal: Plan of Care Review  Outcome: Ongoing (interventions implemented as appropriate)  Pt remains free of falls and injuries. Pt on CL diet, with some nausea overnight. Zofran given. IVFs and abx cont as scheduled. Pain managed with PRN Dilaudid Q3H. VSSAF. Pt ambulates independently, still on menses, will cont to monitor.

## 2017-05-17 NOTE — PLAN OF CARE
Problem: Patient Care Overview  Goal: Plan of Care Review  Outcome: Ongoing (interventions implemented as appropriate)  Patient is A/Ox4, remains free of falls, and states pain is being well managed with ordered medicines.  Patient states clear liquids are being tolerated well, and menses related pain is dissipating.  Patient ambulates independently, and tolerates PO and IV medicines well.  Patient remains afebrile and free of s/s's of infection.  Will continue to monitor patient for pain, safety and infection.

## 2017-05-19 ENCOUNTER — PATIENT OUTREACH (OUTPATIENT)
Dept: ADMINISTRATIVE | Facility: CLINIC | Age: 47
End: 2017-05-19
Payer: MEDICAID

## 2017-05-19 NOTE — PATIENT INSTRUCTIONS
Abdominal Pain  Abdominal pain is pain in the stomach or intestinal area. Everyone has this pain from time to time. In many cases it goes away on its own. But abdominal pain can sometimes be due to a serious problem, such as appendicitis. So its important to know when to seek help.  Causes of abdominal pain  There are many possible causes of abdominal pain. Common causes in adults include:  Constipation, diarrhea, or gas  GERD (movement of stomach acid into the esophagus, also known as acid reflux or heartburn)  Peptic ulcer (a sore in the lining of the stomach or small intestine)  Inflammation of the gallbladder or pancreas  Gallstones or kidney stones  Hernia (bulging of an internal organ through a muscle or other tissue)  Urinary tract infections  In women, menstrual cramps, fibroids, or endometriosis of the uterus  Inflammation or infection of the intestines  Diagnosing the cause of abdominal pain  Your health care provider will examine you to help find the cause of your pain. If needed, tests will be ordered. Because abdominal pain has so many possible causes, it can be hard to discover the reason for the pain. Giving details about your pain can help. Be ready to tell your health care provider where and when you feel the pain and what makes it better or worse. Also mention whether you have other symptoms such as fever, tiredness, nausea, vomiting, or changes in bathroom habits.  Treating abdominal pain  Certain causes of pain, such as appendicitis or a bowel obstruction, need emergency treatment. Other problems can be treated with rest, fluids, or medications. Your health care provider can give you specific instructions for treatment or self-care based on the cause of your pain.    If you are have vomiting or diarrhea, sip water or other clear fluids. When you are ready to eat solid foods again, start with small amounts of easy-to-digest, low-fat foods, such as applesauce, toast, or crackers.   When to call  the doctor  Call 911 or go to the hospital right away if you:  Cant pass stool and are vomiting  Are vomiting blood or have black tarry diarrhea  Also have chest, neck, or shoulder pain  Feel like you are about to pass out  Have pain in your shoulder blades with nausea  Have sudden, excruciating abdominal pain  Have new, severe pain unlike any you have felt before  Have a belly that is rigid, hard, and tender to touch  Call your doctor if you have:  Pain for more than 5 days  Bloating for more than 2 days  Diarrhea for more than 5 days  Fever of 101°F or higher  Pain that continues to worsen  Unexplained weight loss  Continued lack of appetite  Blood in the stool  How to prevent abdominal pain  Here are some tips to help prevent abdominal pain:  Eat smaller amounts of food at one time.  Avoid greasy, fried, or other high-fat foods.  Avoid foods that give you gas.  Exercise regularly.  Drink plenty of fluids.  To help prevent symptoms of gastroesophageal reflux (GERD):  Quit smoking.  Lose excess weight.  Finish eating at least 2 hours before you go to bed or lie down.  Elevate the head of your bed.  © 1732-5391 [employer], [address] [place] [address], [place] [address]. All rights reserved. This information is not intended as a substitute for professional medical care. Always follow your healthcare professional's instructions.

## 2017-05-20 LAB
BACTERIA BLD CULT: NORMAL
BACTERIA BLD CULT: NORMAL

## 2019-01-11 ENCOUNTER — HOSPITAL ENCOUNTER (EMERGENCY)
Facility: HOSPITAL | Age: 49
Discharge: HOME | End: 2019-01-11
Attending: EMERGENCY MEDICINE
Payer: MEDICAID

## 2019-01-11 VITALS
HEIGHT: 55 IN | TEMPERATURE: 98 F | WEIGHT: 175 LBS | OXYGEN SATURATION: 98 % | BODY MASS INDEX: 40.5 KG/M2 | DIASTOLIC BLOOD PRESSURE: 66 MMHG | RESPIRATION RATE: 20 BRPM | SYSTOLIC BLOOD PRESSURE: 119 MMHG | HEART RATE: 79 BPM

## 2019-01-11 DIAGNOSIS — G89.29 CHRONIC NONINTRACTABLE HEADACHE, UNSPECIFIED HEADACHE TYPE: Primary | ICD-10-CM

## 2019-01-11 DIAGNOSIS — N39.0 URINARY TRACT INFECTION WITH HEMATURIA, SITE UNSPECIFIED: ICD-10-CM

## 2019-01-11 DIAGNOSIS — R51.9 CHRONIC NONINTRACTABLE HEADACHE, UNSPECIFIED HEADACHE TYPE: Primary | ICD-10-CM

## 2019-01-11 DIAGNOSIS — R42 LIGHTHEADEDNESS: ICD-10-CM

## 2019-01-11 DIAGNOSIS — M54.2 NECK PAIN: ICD-10-CM

## 2019-01-11 DIAGNOSIS — R31.9 URINARY TRACT INFECTION WITH HEMATURIA, SITE UNSPECIFIED: ICD-10-CM

## 2019-01-11 LAB
ALBUMIN SERPL BCP-MCNC: 4 G/DL
ALP SERPL-CCNC: 75 U/L
ALT SERPL W/O P-5'-P-CCNC: 14 U/L
ANION GAP SERPL CALC-SCNC: 9 MMOL/L
AST SERPL-CCNC: 22 U/L
BACTERIA #/AREA URNS HPF: ABNORMAL /HPF
BASOPHILS # BLD AUTO: 0.02 K/UL
BASOPHILS NFR BLD: 0.2 %
BILIRUB SERPL-MCNC: 0.4 MG/DL
BILIRUB UR QL STRIP: NEGATIVE
BUN SERPL-MCNC: 8 MG/DL
CALCIUM SERPL-MCNC: 9.7 MG/DL
CHLORIDE SERPL-SCNC: 102 MMOL/L
CLARITY UR: ABNORMAL
CO2 SERPL-SCNC: 27 MMOL/L
COLOR UR: YELLOW
CREAT SERPL-MCNC: 0.8 MG/DL
DIFFERENTIAL METHOD: ABNORMAL
EOSINOPHIL # BLD AUTO: 0.1 K/UL
EOSINOPHIL NFR BLD: 0.9 %
ERYTHROCYTE [DISTWIDTH] IN BLOOD BY AUTOMATED COUNT: 14.3 %
EST. GFR  (AFRICAN AMERICAN): >60 ML/MIN/1.73 M^2
EST. GFR  (NON AFRICAN AMERICAN): >60 ML/MIN/1.73 M^2
GLUCOSE SERPL-MCNC: 81 MG/DL
GLUCOSE UR QL STRIP: NEGATIVE
HCT VFR BLD AUTO: 39.4 %
HGB BLD-MCNC: 13.1 G/DL
HGB UR QL STRIP: ABNORMAL
HYALINE CASTS #/AREA URNS LPF: 0 /LPF
KETONES UR QL STRIP: NEGATIVE
LEUKOCYTE ESTERASE UR QL STRIP: ABNORMAL
LYMPHOCYTES # BLD AUTO: 3.2 K/UL
LYMPHOCYTES NFR BLD: 25.3 %
MCH RBC QN AUTO: 27.9 PG
MCHC RBC AUTO-ENTMCNC: 33.2 G/DL
MCV RBC AUTO: 84 FL
MICROSCOPIC COMMENT: ABNORMAL
MONOCYTES # BLD AUTO: 1.2 K/UL
MONOCYTES NFR BLD: 9 %
NEUTROPHILS # BLD AUTO: 8.2 K/UL
NEUTROPHILS NFR BLD: 64.6 %
NITRITE UR QL STRIP: NEGATIVE
PH UR STRIP: 6 [PH] (ref 5–8)
PLATELET # BLD AUTO: 399 K/UL
PMV BLD AUTO: 10.4 FL
POTASSIUM SERPL-SCNC: 4.1 MMOL/L
PROT SERPL-MCNC: 7.8 G/DL
PROT UR QL STRIP: ABNORMAL
RBC # BLD AUTO: 4.69 M/UL
RBC #/AREA URNS HPF: >100 /HPF (ref 0–4)
SODIUM SERPL-SCNC: 138 MMOL/L
SP GR UR STRIP: 1.02 (ref 1–1.03)
SQUAMOUS #/AREA URNS HPF: 2 /HPF
TRICHOMONAS UR QL MICRO: ABNORMAL
URN SPEC COLLECT METH UR: ABNORMAL
UROBILINOGEN UR STRIP-ACNC: ABNORMAL EU/DL
WBC # BLD AUTO: 12.73 K/UL
WBC #/AREA URNS HPF: 8 /HPF (ref 0–5)

## 2019-01-11 PROCEDURE — 96361 HYDRATE IV INFUSION ADD-ON: CPT

## 2019-01-11 PROCEDURE — 63600175 PHARM REV CODE 636 W HCPCS: Performed by: PHYSICIAN ASSISTANT

## 2019-01-11 PROCEDURE — 81000 URINALYSIS NONAUTO W/SCOPE: CPT

## 2019-01-11 PROCEDURE — 93010 EKG 12-LEAD: ICD-10-PCS | Mod: ,,, | Performed by: INTERNAL MEDICINE

## 2019-01-11 PROCEDURE — 99284 EMERGENCY DEPT VISIT MOD MDM: CPT | Mod: 25

## 2019-01-11 PROCEDURE — 96375 TX/PRO/DX INJ NEW DRUG ADDON: CPT

## 2019-01-11 PROCEDURE — 85025 COMPLETE CBC W/AUTO DIFF WBC: CPT

## 2019-01-11 PROCEDURE — 93005 ELECTROCARDIOGRAM TRACING: CPT

## 2019-01-11 PROCEDURE — 25000003 PHARM REV CODE 250: Performed by: PHYSICIAN ASSISTANT

## 2019-01-11 PROCEDURE — 80053 COMPREHEN METABOLIC PANEL: CPT

## 2019-01-11 PROCEDURE — 96374 THER/PROPH/DIAG INJ IV PUSH: CPT

## 2019-01-11 PROCEDURE — 93010 ELECTROCARDIOGRAM REPORT: CPT | Mod: ,,, | Performed by: INTERNAL MEDICINE

## 2019-01-11 RX ORDER — IBUPROFEN 600 MG/1
600 TABLET ORAL 3 TIMES DAILY
COMMUNITY
End: 2019-01-11

## 2019-01-11 RX ORDER — ACETAMINOPHEN 500 MG
500 TABLET ORAL EVERY 4 HOURS PRN
Qty: 20 TABLET | Refills: 0 | Status: SHIPPED | OUTPATIENT
Start: 2019-01-11 | End: 2019-01-16

## 2019-01-11 RX ORDER — CEPHALEXIN 500 MG/1
500 CAPSULE ORAL
Status: COMPLETED | OUTPATIENT
Start: 2019-01-11 | End: 2019-01-11

## 2019-01-11 RX ORDER — IBUPROFEN 600 MG/1
600 TABLET ORAL EVERY 6 HOURS PRN
Qty: 20 TABLET | Refills: 0 | Status: SHIPPED | OUTPATIENT
Start: 2019-01-11 | End: 2019-01-16

## 2019-01-11 RX ORDER — CEPHALEXIN 500 MG/1
500 CAPSULE ORAL EVERY 12 HOURS
Qty: 20 CAPSULE | Refills: 0 | Status: SHIPPED | OUTPATIENT
Start: 2019-01-11 | End: 2019-01-21

## 2019-01-11 RX ORDER — ONDANSETRON 4 MG/1
4 TABLET, ORALLY DISINTEGRATING ORAL EVERY 6 HOURS PRN
Qty: 15 TABLET | Refills: 0 | Status: SHIPPED | OUTPATIENT
Start: 2019-01-11 | End: 2019-01-16

## 2019-01-11 RX ORDER — ONDANSETRON 2 MG/ML
4 INJECTION INTRAMUSCULAR; INTRAVENOUS
Status: COMPLETED | OUTPATIENT
Start: 2019-01-11 | End: 2019-01-11

## 2019-01-11 RX ORDER — KETOROLAC TROMETHAMINE 30 MG/ML
15 INJECTION, SOLUTION INTRAMUSCULAR; INTRAVENOUS
Status: COMPLETED | OUTPATIENT
Start: 2019-01-11 | End: 2019-01-11

## 2019-01-11 RX ORDER — LIDOCAINE 50 MG/G
1 PATCH TOPICAL
Status: DISCONTINUED | OUTPATIENT
Start: 2019-01-11 | End: 2019-01-11 | Stop reason: HOSPADM

## 2019-01-11 RX ORDER — CYCLOBENZAPRINE HCL 10 MG
10 TABLET ORAL 3 TIMES DAILY PRN
Qty: 12 TABLET | Refills: 0 | Status: SHIPPED | OUTPATIENT
Start: 2019-01-11 | End: 2019-01-16

## 2019-01-11 RX ORDER — ACETAMINOPHEN 500 MG
500 TABLET ORAL
Status: COMPLETED | OUTPATIENT
Start: 2019-01-11 | End: 2019-01-11

## 2019-01-11 RX ORDER — LIDOCAINE 50 MG/G
1 PATCH TOPICAL DAILY
Qty: 15 PATCH | Refills: 0 | Status: SHIPPED | OUTPATIENT
Start: 2019-01-11 | End: 2019-01-26

## 2019-01-11 RX ADMIN — CEPHALEXIN 500 MG: 500 CAPSULE ORAL at 02:01

## 2019-01-11 RX ADMIN — SODIUM CHLORIDE 1000 ML: 0.9 INJECTION, SOLUTION INTRAVENOUS at 11:01

## 2019-01-11 RX ADMIN — ACETAMINOPHEN 500 MG: 500 TABLET, FILM COATED ORAL at 02:01

## 2019-01-11 RX ADMIN — ONDANSETRON 4 MG: 2 INJECTION INTRAMUSCULAR; INTRAVENOUS at 12:01

## 2019-01-11 RX ADMIN — LIDOCAINE 1 PATCH: 50 PATCH TOPICAL at 12:01

## 2019-01-11 RX ADMIN — KETOROLAC TROMETHAMINE 15 MG: 30 INJECTION, SOLUTION INTRAMUSCULAR; INTRAVENOUS at 12:01
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
## 2019-01-11 NOTE — ED TRIAGE NOTES
Pt. Reports headaches to her occipital head and frontal lobe aches. Pt. Reports pain is thumping and pt. States she thought her pressure was elevated. Pt. Reports n/v intermitting. Pt. Reports emesis x1 within the last 24 hours. Pt. Reports she has been having a symptoms for the past 3 days.   Pt. Reports right side hand pain for the past 3 days. Pt. Reports pain is like a soreness to her hand. 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

## 2019-01-11 NOTE — DISCHARGE INSTRUCTIONS
Take Keflex full course as prescribed for UTI. Take Ibuprofen, Tylenol and Flexeril for pain.   Take Zofran for nausea.     Follow up with primary care in 2 days and [facility] for persisting symptoms. Return to ER for worsening symptoms or as needed.

## 2019-02-02 ENCOUNTER — HOSPITAL ENCOUNTER (EMERGENCY)
Facility: HOSPITAL | Age: 49
Discharge: HOME | End: 2019-02-02
Attending: EMERGENCY MEDICINE
Payer: MEDICAID

## 2019-02-02 VITALS
WEIGHT: 170 LBS | SYSTOLIC BLOOD PRESSURE: 124 MMHG | TEMPERATURE: 99 F | RESPIRATION RATE: 18 BRPM | HEART RATE: 99 BPM | BODY MASS INDEX: 33.38 KG/M2 | HEIGHT: 60 IN | DIASTOLIC BLOOD PRESSURE: 78 MMHG | OXYGEN SATURATION: 100 %

## 2019-02-02 DIAGNOSIS — N93.9 EPISODE OF HEAVY VAGINAL BLEEDING: Primary | ICD-10-CM

## 2019-02-02 LAB
B-HCG UR QL: NEGATIVE
CTP QC/QA: YES

## 2019-02-02 PROCEDURE — 99283 EMERGENCY DEPT VISIT LOW MDM: CPT

## 2019-02-02 PROCEDURE — 25000003 PHARM REV CODE 250: Performed by: PHYSICIAN ASSISTANT

## 2019-02-02 PROCEDURE — 81025 URINE PREGNANCY TEST: CPT | Performed by: PHYSICIAN ASSISTANT

## 2019-02-02 RX ORDER — NAPROXEN 500 MG/1
500 TABLET ORAL
Status: COMPLETED | OUTPATIENT
Start: 2019-02-02 | End: 2019-02-02

## 2019-02-02 RX ORDER — NAPROXEN 500 MG/1
500 TABLET ORAL 2 TIMES DAILY WITH MEALS
Qty: 10 TABLET | Refills: 0 | Status: SHIPPED | OUTPATIENT
Start: 2019-02-02 | End: 2019-02-07

## 2019-02-02 RX ADMIN — NAPROXEN 500 MG: 500 TABLET ORAL at 09:02

## 2019-02-02 NOTE — DISCHARGE INSTRUCTIONS
Naproxen twice daily.  Drink lots of fluids, stay well-hydrated.  Follow up with her Ob early this week.  Return to this ED if you begin with lightheadedness or dizziness, if you begin with worsening pain, if you begin with fever, if any other problems occur.

## 2019-02-02 NOTE — ED PROVIDER NOTES
Encounter Date: 2/2/2019       History     Chief Complaint   Patient presents with    Vaginal Bleeding     vag bleeding x 1 week.  going through 3 pads in an hour.  generalized leg pain.      48-year-old female, history of GERD, presents to ED complaining of heavy vaginal bleeding times 3-4 days.  States soaking 2-3 pads per day.  LMP last week.  She does admit to metrorrhagia due to uterine fibroids.  She admits to lower abdominal cramping similar to menses.  No nausea vomiting. No change in bowel or bladder habits.  No flank pain. No fever.  She denies history of anemia.  Denies lightheadedness or dizziness.  Denies syncope or presyncope.  No alleviating or exacerbating factors.  No radiation of symptoms. Symptoms acute, constant, severity 5/10.    States currently on p.o. antibiotics secondary to UTI diagnosed last week.          Review of patient's allergies indicates:  No Known Allergies  Past Medical History:   Diagnosis Date    Former smoker     GERD (gastroesophageal reflux disease)      Past Surgical History:   Procedure Laterality Date    FINGER EXPLORATION      TUBAL LIGATION       History reviewed. No pertinent family history.  Social History     Tobacco Use    Smoking status: Former Smoker     Types: Cigarettes    Smokeless tobacco: Never Used   Substance Use Topics    Alcohol use: Yes     Comment: occ    Drug use: No     Review of Systems   Constitutional: Negative for chills and fever.   HENT: Negative for sore throat.    Eyes: Negative.    Respiratory: Negative for shortness of breath.    Cardiovascular: Negative for chest pain.   Gastrointestinal: Positive for abdominal pain. Negative for constipation, diarrhea, nausea and vomiting.   Endocrine: Negative.    Genitourinary: Positive for vaginal bleeding. Negative for difficulty urinating, dysuria, flank pain, frequency, hematuria, pelvic pain, vaginal discharge and vaginal pain.   Musculoskeletal: Negative for back pain, neck pain and neck  stiffness.   Skin: Negative for rash.   Neurological: Negative for weakness and headaches.   Hematological: Does not bruise/bleed easily.   Psychiatric/Behavioral: Negative.    All other systems reviewed and are negative.      Physical Exam     Initial Vitals [02/02/19 0850]   BP Pulse Resp Temp SpO2   (!) 150/78 (!) 123 18 98.7 °F (37.1 °C) 98 %      MAP       --         Physical Exam    Nursing note and vitals reviewed.  Constitutional: She appears well-developed and well-nourished. She is not diaphoretic. No distress.   Overall well-appearing, nontoxic.  Resting comfortably on exam table.   HENT:   Head: Normocephalic and atraumatic.   Mouth/Throat: Oropharynx is clear and moist.   Mucous membranes moist.   Eyes: Conjunctivae and EOM are normal. Pupils are equal, round, and reactive to light.   Neck: Normal range of motion. Neck supple. No tracheal deviation present.   Cardiovascular: Intact distal pulses.   Pulmonary/Chest: Breath sounds normal. No stridor. No respiratory distress. She has no wheezes.   Abdominal:   Abdomen overall soft, normal bowel sounds ×4.  No significant tenderness to palpation of any quadrant.  No rebound or guarding.  No palpable mass or distention.  No flank or CVA tenderness.  Negative Hobson sign.  No pain over McBurney's point.   Genitourinary:   Genitourinary Comments: Deferred  exam   Musculoskeletal: Normal range of motion. She exhibits no tenderness.   Lymphadenopathy:     She has no cervical adenopathy.   Neurological: She is alert and oriented to person, place, and time.   Skin: Skin is warm and dry. Capillary refill takes less than 2 seconds.   Psychiatric: She has a normal mood and affect. Her behavior is normal. Judgment and thought content normal.         ED Course   Procedures  Labs Reviewed   POCT URINE PREGNANCY          Imaging Results    None          Medical Decision Making:   Differential Diagnosis:   Metrorrhagia, UTI, STI, dysfunctional uterine bleeding  ED  Management:  Patient with history of dysfunctional uterine bleeding secondary to uterine fibroid.  She states last menstrual period was last week, however begin with heavy vaginal bleeding times 3-4 days.  Denies worsening abdominal pain. Denies nausea vomiting. Denies fever.  Denies flank pain.  Denies dysuria.  Denies vaginal discharge. Denies vaginal discomfort.  Denies  rash or any lesions. Patient states she is currently taking by mouth antibiotics due to a urinary tract infection.  She is well-appearing and nontoxic.  Vitals are reassuring.  Heart rate improved without treatment.  Her belly is soft without tenderness. She deferred  exam.  Patient requesting medication to help slow down bleeding.  She denies lightheadedness or dizziness.  No history of anemia.  Do not suspect orthostasis.  Will treat with NSAIDs, have her follow up with her OBGYN early this week.  We have discussed return precautions.  She does understand and agree with this plan.                      Clinical Impression:   The encounter diagnosis was Episode of heavy vaginal bleeding.      Disposition:   Disposition: Discharged  Condition: Stable                        River Carrillo PA-C  02/02/19 1002

## 2019-03-07 ENCOUNTER — HOSPITAL ENCOUNTER (EMERGENCY)
Facility: HOSPITAL | Age: 49
Discharge: HOME | End: 2019-03-07
Attending: EMERGENCY MEDICINE
Payer: MEDICAID

## 2019-03-07 VITALS
RESPIRATION RATE: 18 BRPM | BODY MASS INDEX: 29.88 KG/M2 | TEMPERATURE: 98 F | DIASTOLIC BLOOD PRESSURE: 79 MMHG | SYSTOLIC BLOOD PRESSURE: 137 MMHG | WEIGHT: 175 LBS | HEART RATE: 117 BPM | HEIGHT: 64 IN | OXYGEN SATURATION: 98 %

## 2019-03-07 DIAGNOSIS — N94.6 MENSTRUAL CRAMP: ICD-10-CM

## 2019-03-07 DIAGNOSIS — D25.9 UTERINE LEIOMYOMA, UNSPECIFIED LOCATION: ICD-10-CM

## 2019-03-07 DIAGNOSIS — N94.6 DYSMENORRHEA: Primary | ICD-10-CM

## 2019-03-07 LAB
B-HCG UR QL: NEGATIVE
BACTERIA #/AREA URNS HPF: ABNORMAL /HPF
BASOPHILS # BLD AUTO: 0.01 K/UL
BASOPHILS NFR BLD: 0.1 %
BILIRUB UR QL STRIP: NEGATIVE
CLARITY UR: CLEAR
COLOR UR: YELLOW
CTP QC/QA: YES
DIFFERENTIAL METHOD: ABNORMAL
EOSINOPHIL # BLD AUTO: 0 K/UL
EOSINOPHIL NFR BLD: 0.3 %
ERYTHROCYTE [DISTWIDTH] IN BLOOD BY AUTOMATED COUNT: 14.5 %
GLUCOSE UR QL STRIP: NEGATIVE
HCT VFR BLD AUTO: 38.4 %
HGB BLD-MCNC: 12.5 G/DL
HGB UR QL STRIP: ABNORMAL
KETONES UR QL STRIP: ABNORMAL
LEUKOCYTE ESTERASE UR QL STRIP: ABNORMAL
LYMPHOCYTES # BLD AUTO: 3.1 K/UL
LYMPHOCYTES NFR BLD: 25.2 %
MCH RBC QN AUTO: 27.8 PG
MCHC RBC AUTO-ENTMCNC: 32.6 G/DL
MCV RBC AUTO: 86 FL
MICROSCOPIC COMMENT: ABNORMAL
MONOCYTES # BLD AUTO: 0.8 K/UL
MONOCYTES NFR BLD: 6.8 %
NEUTROPHILS # BLD AUTO: 8.4 K/UL
NEUTROPHILS NFR BLD: 67.6 %
NITRITE UR QL STRIP: NEGATIVE
PH UR STRIP: 6 [PH] (ref 5–8)
PLATELET # BLD AUTO: 395 K/UL
PMV BLD AUTO: 10.2 FL
PROT UR QL STRIP: NEGATIVE
RBC # BLD AUTO: 4.49 M/UL
RBC #/AREA URNS HPF: >100 /HPF (ref 0–4)
SP GR UR STRIP: 1.01 (ref 1–1.03)
SQUAMOUS #/AREA URNS HPF: 2 /HPF
URN SPEC COLLECT METH UR: ABNORMAL
UROBILINOGEN UR STRIP-ACNC: NEGATIVE EU/DL
WBC # BLD AUTO: 12.43 K/UL
WBC #/AREA URNS HPF: 0 /HPF (ref 0–5)

## 2019-03-07 PROCEDURE — 81025 URINE PREGNANCY TEST: CPT | Performed by: PHYSICIAN ASSISTANT

## 2019-03-07 PROCEDURE — 81000 URINALYSIS NONAUTO W/SCOPE: CPT

## 2019-03-07 PROCEDURE — 99284 EMERGENCY DEPT VISIT MOD MDM: CPT | Mod: 25

## 2019-03-07 PROCEDURE — 85025 COMPLETE CBC W/AUTO DIFF WBC: CPT

## 2019-03-07 PROCEDURE — 25000003 PHARM REV CODE 250: Performed by: EMERGENCY MEDICINE

## 2019-03-07 RX ORDER — IBUPROFEN 600 MG/1
600 TABLET ORAL
Status: COMPLETED | OUTPATIENT
Start: 2019-03-07 | End: 2019-03-07

## 2019-03-07 RX ORDER — HYDROCODONE BITARTRATE AND ACETAMINOPHEN 5; 325 MG/1; MG/1
1 TABLET ORAL EVERY 6 HOURS PRN
Qty: 8 TABLET | Refills: 0 | Status: SHIPPED | OUTPATIENT
Start: 2019-03-07

## 2019-03-07 RX ORDER — HYDROCODONE BITARTRATE AND ACETAMINOPHEN 5; 325 MG/1; MG/1
1 TABLET ORAL
Status: COMPLETED | OUTPATIENT
Start: 2019-03-07 | End: 2019-03-07

## 2019-03-07 RX ORDER — NAPROXEN 500 MG/1
500 TABLET ORAL 2 TIMES DAILY PRN
Qty: 30 TABLET | Refills: 0 | Status: SHIPPED | OUTPATIENT
Start: 2019-03-07 | End: 2019-03-22

## 2019-03-07 RX ADMIN — HYDROCODONE BITARTRATE AND ACETAMINOPHEN 1 TABLET: 5; 325 TABLET ORAL at 12:03

## 2019-03-07 RX ADMIN — IBUPROFEN 600 MG: 600 TABLET ORAL at 12:03

## 2019-03-07 NOTE — ED TRIAGE NOTES
Reports being on period that started 2 days ago, with noted increased bleeding starting last night. Reports Hx of uterine fibroids, reports having pain to bilateral legs.

## 2019-03-07 NOTE — ED PROVIDER NOTES
Encounter Date: 3/7/2019  SORT: This is a 49 y.o. female with PMHx uterine fibroids who presents for emergent consideration of heavy uterine bleeding. Menstrual cycle began 2 days ago. She reports using 8 pads per day. Patient will be moved to a room when one is available. Orders placed.  SHRUTHI Simms PA-C        History     Chief Complaint   Patient presents with    Abdominal Pain     lower abd pain x 2 days when cycle started. Took ibuprofen last night without relief.     49-year-old female presents with lower abdominal cramping discomfort.  She has a known history of fibroid and was seeing an OBGYN L issue, apparently she was scheduled for surgery but then at the last minute decided not to have surgery.  She states she was doing better until the past couple months.  She reports lower abdominal cramping, heavy bleeding that occurs with her menses.  She states she has a follow-up appointment with her primary care physician tomorrow to get a referral to OBGYN.  She states her symptoms today are consistent with previous episodes of menstrual cycle pain and she presented to the ER to get additional analgesia.  She states she has been taking Motrin 600 mg without relief as well as equate acetaminophen for menstrual cycles.  She denies any urinary symptoms, fever, upper abdominal pain, nausea or vomiting.          Review of patient's allergies indicates:  No Known Allergies  Past Medical History:   Diagnosis Date    Former smoker     GERD (gastroesophageal reflux disease)      Past Surgical History:   Procedure Laterality Date    FINGER EXPLORATION      TUBAL LIGATION       History reviewed. No pertinent family history.  Social History     Tobacco Use    Smoking status: Former Smoker     Types: Cigarettes    Smokeless tobacco: Never Used   Substance Use Topics    Alcohol use: Yes     Comment: occ    Drug use: No     Review of Systems   Constitutional: Negative for chills and fever.   HENT: Negative for  congestion and sore throat.    Eyes: Negative for visual disturbance.   Respiratory: Negative for cough and shortness of breath.    Cardiovascular: Negative for chest pain.   Gastrointestinal: Negative for abdominal pain, nausea and vomiting.   Genitourinary: Positive for menstrual problem, pelvic pain and vaginal bleeding. Negative for dysuria and vaginal discharge.   Skin: Negative for rash.   Allergic/Immunologic: Negative for immunocompromised state.   Neurological: Negative for headaches.       Physical Exam     Initial Vitals [03/07/19 1129]   BP Pulse Resp Temp SpO2   138/74 (!) 113 20 98.6 °F (37 °C) 98 %      MAP       --         Physical Exam    Constitutional: She appears well-developed and well-nourished. She is not diaphoretic.   Patient is tearful and upset   HENT:   Mouth/Throat: Oropharynx is clear and moist.   Eyes: Pupils are equal, round, and reactive to light.   Neck: Neck supple.   Cardiovascular: Regular rhythm. Tachycardia present.    Pulmonary/Chest: Breath sounds normal.   Abdominal: Soft. Bowel sounds are normal. There is tenderness (Suprapubic tenderness).   Neurological: She is alert and oriented to person, place, and time.   Skin: Skin is warm and dry.   Psychiatric:   Anxious         ED Course   Procedures  Labs Reviewed   CBC W/ AUTO DIFFERENTIAL - Abnormal; Notable for the following components:       Result Value    Platelets 395 (*)     Gran # (ANC) 8.4 (*)     All other components within normal limits   URINALYSIS, REFLEX TO URINE CULTURE - Abnormal; Notable for the following components:    Ketones, UA Trace (*)     Occult Blood UA 3+ (*)     Leukocytes, UA Trace (*)     All other components within normal limits    Narrative:     Preferred Collection Type->Urine, Clean Catch   URINALYSIS MICROSCOPIC - Abnormal; Notable for the following components:    RBC, UA >100 (*)     All other components within normal limits    Narrative:     Preferred Collection Type->Urine, Clean Catch   POCT  URINE PREGNANCY          Imaging Results    None          Medical Decision Making:   Initial Assessment:   49-year-old female presents with lower abdominal cramping, heavy bleeding.  Symptoms are in association with her menses.  Symptoms are consistent with previous episodes of menstrual cycle pain.  Chart reviewed and patient has a known fibroid measuring approximately 10 cm from CT scan done in 2017.  She was initially follow with [facility] but had a negative experience and therefore would like to transfer care.  Her exam is notable for tachycardia, however, patient is crying during exam.  Her abdomen is tender in the suprapubic tender region but otherwise soft and nontender in the upper regions.  I suspect dysmenorrhea.  I did offer to perform a pelvic exam to assess the amount of bleeding which patient declined.  She states it is too painful.  Also offered to obtain laboratory analysis as she is tachycardic with vaginal bleeding to check H&H.  She is agreeable to staying for blood work.  As she has been taking 600 mg of Motrin without relief, I will add Norco to this regimen for breakthrough pain.  She has follow-up scheduled tomorrow to get referral to OBGYN.  She is not having any urinary symptoms to suggest UTI, no upper abdominal pain to suggest cholecystitis.  No nausea, vomiting or fever to suggest appendicitis.  ED Management:  On reassessment patient is resting comfortably, she states she feels improved.  At this time, will discharge patient with NSAIDs, Norco if needed for breakthrough pain, I did provide her with information for OBGYN follow-up.  She is to follow up with her primary care physician tomorrow scheduled.                      Clinical Impression:       ICD-10-CM ICD-9-CM   1. Dysmenorrhea N94.6 625.3   2. Uterine leiomyoma, unspecified location D25.9 218.9   3. Menstrual cramp N94.6 625.3                                Imelda Raza MD  03/07/19 1740

## 2020-02-23 ENCOUNTER — HOSPITAL ENCOUNTER (EMERGENCY)
Facility: HOSPITAL | Age: 50
Discharge: HOME | End: 2020-02-23
Attending: EMERGENCY MEDICINE
Payer: MEDICAID

## 2020-02-23 VITALS
BODY MASS INDEX: 32.27 KG/M2 | HEART RATE: 91 BPM | OXYGEN SATURATION: 96 % | TEMPERATURE: 99 F | HEIGHT: 64 IN | SYSTOLIC BLOOD PRESSURE: 142 MMHG | RESPIRATION RATE: 18 BRPM | DIASTOLIC BLOOD PRESSURE: 87 MMHG | WEIGHT: 189 LBS

## 2020-02-23 DIAGNOSIS — K08.89 DENTALGIA: Primary | ICD-10-CM

## 2020-02-23 PROCEDURE — 99284 EMERGENCY DEPT VISIT MOD MDM: CPT | Mod: 25

## 2020-02-23 PROCEDURE — 96372 THER/PROPH/DIAG INJ SC/IM: CPT

## 2020-02-23 PROCEDURE — 63600175 PHARM REV CODE 636 W HCPCS: Performed by: NURSE PRACTITIONER

## 2020-02-23 PROCEDURE — 25000003 PHARM REV CODE 250: Performed by: NURSE PRACTITIONER

## 2020-02-23 RX ORDER — KETOROLAC TROMETHAMINE 30 MG/ML
30 INJECTION, SOLUTION INTRAMUSCULAR; INTRAVENOUS
Status: COMPLETED | OUTPATIENT
Start: 2020-02-23 | End: 2020-02-23

## 2020-02-23 RX ORDER — OXYCODONE AND ACETAMINOPHEN 5; 325 MG/1; MG/1
1 TABLET ORAL
Status: COMPLETED | OUTPATIENT
Start: 2020-02-23 | End: 2020-02-23

## 2020-02-23 RX ORDER — IBUPROFEN 800 MG/1
800 TABLET ORAL
COMMUNITY
Start: 2019-06-25 | End: 2020-12-03

## 2020-02-23 RX ORDER — NAPROXEN 500 MG/1
500 TABLET ORAL EVERY 12 HOURS PRN
Qty: 20 TABLET | Refills: 0 | Status: SHIPPED | OUTPATIENT
Start: 2020-02-23 | End: 2020-12-03 | Stop reason: HOSPADM

## 2020-02-23 RX ORDER — PENICILLIN V POTASSIUM 500 MG/1
500 TABLET, FILM COATED ORAL 4 TIMES DAILY
Qty: 20 TABLET | Refills: 0 | Status: SHIPPED | OUTPATIENT
Start: 2020-02-23 | End: 2020-02-28

## 2020-02-23 RX ADMIN — OXYCODONE HYDROCHLORIDE AND ACETAMINOPHEN 1 TABLET: 5; 325 TABLET ORAL at 07:02

## 2020-02-23 RX ADMIN — KETOROLAC TROMETHAMINE 30 MG: 30 INJECTION, SOLUTION INTRAMUSCULAR; INTRAVENOUS at 07:02

## 2020-02-24 NOTE — ED PROVIDER NOTES
Encounter Date: 2/23/2020    SCRIBE #1 NOTE: I, Velma Valdez, am scribing for, and in the presence of,  Pop Hernández NP. I have scribed the following portions of the note - Other sections scribed: HPI, ROS, PE.       History     Chief Complaint   Patient presents with    Dental Pain     pt reports right sided dental pain and swelling. has an appointment to get teeth pulled but cant wait for the appointment      CC: Dental Pain    HPI:  Janna Jacobs is a 50 y.o. Female, with a PMHx of GERD, who presents to the Emergency Department with a cc of right sided upper dental pain that suddenly worsened today. Pt has been having dental problems for a while now, but reports that the pain recently became unbearable. She has a dentist appointment scheduled in 5 days, but is requesting medication to relieve the pain. She attempted tx with Ibuprofen with little to no relief. Pt denies any fevers, chills, nausea, vomiting, diarrhea, dysuria, neck stiffness, photophobia, or any other complaints.    The history is provided by the patient. No  was used.     Review of patient's allergies indicates:  No Known Allergies  Past Medical History:   Diagnosis Date    Former smoker     GERD (gastroesophageal reflux disease)      Past Surgical History:   Procedure Laterality Date    FINGER EXPLORATION      HYSTERECTOMY      TUBAL LIGATION       History reviewed. No pertinent family history.  Social History     Tobacco Use    Smoking status: Former Smoker     Types: Cigarettes    Smokeless tobacco: Never Used   Substance Use Topics    Alcohol use: Yes     Comment: occ    Drug use: No     Review of Systems   Constitutional: Negative for chills and fever.   HENT: Positive for dental problem (right-sided upper). Negative for congestion and sore throat.    Eyes: Negative for photophobia.   Respiratory: Negative for shortness of breath.    Cardiovascular: Negative for chest pain.   Gastrointestinal: Negative  for diarrhea, nausea and vomiting.   Genitourinary: Negative for dysuria.   Musculoskeletal: Negative for back pain and neck stiffness.   Skin: Negative for rash.   Neurological: Negative for seizures and syncope.   Hematological: Does not bruise/bleed easily.   Psychiatric/Behavioral: Negative for confusion.       Physical Exam     Initial Vitals [02/23/20 1756]   BP Pulse Resp Temp SpO2   138/82 97 20 98.6 °F (37 °C) 98 %      MAP       --         Physical Exam    Nursing note and vitals reviewed.  Constitutional: She is not diaphoretic. No distress.   HENT:   Head: Normocephalic and atraumatic.   Nose: Nose normal.   Mouth/Throat: Oropharynx is clear and moist. No trismus in the jaw. Abnormal dentition. Dental caries present. No dental abscesses.   Poor dentition throughout mouth with multple carries noted. No erythema. No induration. No sublingual swelling or facial swelling.   Eyes: Conjunctivae and EOM are normal. Pupils are equal, round, and reactive to light. Right eye exhibits no discharge. Left eye exhibits no discharge. No scleral icterus.   Neck: Normal range of motion. Neck supple.   Cardiovascular: Normal rate and regular rhythm.   Pulmonary/Chest: Breath sounds normal. No respiratory distress.   Musculoskeletal: Normal range of motion.   Neurological: She is alert and oriented to person, place, and time. No cranial nerve deficit or sensory deficit. GCS score is 15. GCS eye subscore is 4. GCS verbal subscore is 5. GCS motor subscore is 6.   Skin: Skin is warm and dry. Capillary refill takes less than 2 seconds. No rash noted.   Psychiatric: She has a normal mood and affect.         ED Course   Procedures  Labs Reviewed - No data to display       Imaging Results    None          Medical Decision Making:   History:   Old Medical Records: I decided to obtain old medical records.  Initial Assessment:   Janna Jacobs is a 50 y.o. Female, with a PMHx of GERD, who presents to the Emergency Department  with a cc of right sided upper dental pain that suddenly worsened today.  Differential Diagnosis:   Pulpitis, dental abscess, facial cellulitis, Maik's angina, PTA, parotitis, others  ED Management:  Janna Jacobs is a 50 y.o. female who presents today complaining of dental pain. The patient notes that they have had poor dentition for quite some time however recently it has become very painful. The patient denies any fevers, chills, nausea, vomiting, diarrhea/dysurea, neck stiffness, photophobia, or any other complaints. The pt is tolerating po's.  She is requesting pain control.  She has an appointment with her dentist on Friday but states she cannot stand the pain any longer.      The patient appears to have pulpitis with chronic dental caries.  Based upon the history and physical I see no signs of Maik's angina, sublingual swelling, facial swelling, airway compromise, facial cellulitis, sepsis, dehydration, or a fluctuant abscess to drain.     I believe the patient can be discharged home with antibiotics, anti-inflammatories, and pain medications.     The results and physical exam findings were reviewed with the patient. Advised patient to follow up with her dentist for re-evaluation and further management.  ED return precautions given. All questions regarding diagnosis and plan were answered to the patient's fullest possible satisfaction. Patient expressed understanding of diagnosis, discharge instructions, and return precautions.            Patient note was created using [employer] voice dictation software.  Any errors in syntax or information may not have been identified and edited prior to signing this note.              Scribe Attestation:   Scribe #1: I performed the above scribed service and the documentation accurately describes the services I performed. I attest to the accuracy of the note.                          Clinical Impression:       ICD-10-CM ICD-9-CM   1. Dentalgia K08.89 525.9          Disposition:   Disposition: Discharged  Condition: Stable    Scribe attestation: I, Pop Hernández NP, personally performed the services described in this documentation. All medical record entries made by the scribe were at my direction and in my presence.  I have reviewed the chart and agree that the record reflects my personal performance and is accurate and complete.                   Pop Hernández NP  02/23/20 8550

## 2020-02-24 NOTE — ED TRIAGE NOTES
Pt. Reports pain to the right side of her mouth. Pt. Reports she has been having the pain for a while and has an appt to have her tooth removed. Pt. Reports she is unable to take the pain. Pt. Reports taking motrin at home.

## 2020-02-24 NOTE — DISCHARGE INSTRUCTIONS
Take antibiotics as prescribed until they are gone even if you feel better.  You should not have any left over.  Take pain medication as needed only as prescribed.    Follow-up with your dentist on Friday as scheduled.    Return to the emergency department immediately for any new or worsening symptoms.    Thank you for coming to our Emergency Department today. It is important to remember that some problems are difficult to diagnose and may not be found during your first visit. Be sure to follow up with your primary care doctor.  If you do not have one, you may contact the one listed on your discharge paperwork or you may also call the [facility] Appointment Desk at [phone] to schedule an appointment with one.     Return to the ER with any questions/concerns, new/concerning symptoms, worsening or failure to improve. Do not drive or make any important decisions for 24 hours if you have received any pain medications, sedatives or mood altering drugs during your ER visit.

## 2020-12-03 ENCOUNTER — HOSPITAL ENCOUNTER (EMERGENCY)
Facility: HOSPITAL | Age: 50
Discharge: HOME | End: 2020-12-03
Attending: EMERGENCY MEDICINE
Payer: MEDICAID

## 2020-12-03 VITALS
TEMPERATURE: 99 F | SYSTOLIC BLOOD PRESSURE: 130 MMHG | DIASTOLIC BLOOD PRESSURE: 78 MMHG | RESPIRATION RATE: 20 BRPM | HEIGHT: 62 IN | OXYGEN SATURATION: 99 % | BODY MASS INDEX: 34.96 KG/M2 | HEART RATE: 98 BPM | WEIGHT: 190 LBS

## 2020-12-03 DIAGNOSIS — M25.579 ANKLE PAIN: ICD-10-CM

## 2020-12-03 PROCEDURE — 99284 EMERGENCY DEPT VISIT MOD MDM: CPT | Mod: 25

## 2020-12-03 PROCEDURE — 63600175 PHARM REV CODE 636 W HCPCS: Performed by: PHYSICIAN ASSISTANT

## 2020-12-03 PROCEDURE — 25000003 PHARM REV CODE 250: Performed by: PHYSICIAN ASSISTANT

## 2020-12-03 PROCEDURE — 96372 THER/PROPH/DIAG INJ SC/IM: CPT

## 2020-12-03 RX ORDER — KETOROLAC TROMETHAMINE 30 MG/ML
30 INJECTION, SOLUTION INTRAMUSCULAR; INTRAVENOUS
Status: COMPLETED | OUTPATIENT
Start: 2020-12-03 | End: 2020-12-03

## 2020-12-03 RX ORDER — IBUPROFEN 600 MG/1
600 TABLET ORAL EVERY 6 HOURS PRN
Qty: 20 TABLET | Refills: 0 | Status: SHIPPED | OUTPATIENT
Start: 2020-12-03 | End: 2020-12-08

## 2020-12-03 RX ORDER — ACETAMINOPHEN 500 MG
1000 TABLET ORAL
Status: COMPLETED | OUTPATIENT
Start: 2020-12-03 | End: 2020-12-03

## 2020-12-03 RX ORDER — OXYCODONE AND ACETAMINOPHEN 5; 325 MG/1; MG/1
1 TABLET ORAL EVERY 6 HOURS PRN
Qty: 12 TABLET | Refills: 0 | Status: SHIPPED | OUTPATIENT
Start: 2020-12-03 | End: 2020-12-06

## 2020-12-03 RX ADMIN — ACETAMINOPHEN 1000 MG: 500 TABLET ORAL at 12:12

## 2020-12-03 RX ADMIN — KETOROLAC TROMETHAMINE 30 MG: 30 INJECTION, SOLUTION INTRAMUSCULAR at 12:12

## 2020-12-03 NOTE — DISCHARGE INSTRUCTIONS
Take medication as prescribed. Follow up with primary care and orthopedics in 1-2 days. Return to ER for worsening symptoms or as needed
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
## 2020-12-03 NOTE — Clinical Note
"Janna Choudharypablito Jacobs was seen and treated in our emergency department on 12/3/2020.  She may return to work on 12/07/2020.       If you have any questions or concerns, please don't hesitate to call.      Sun Marroquin PA-C"

## 2022-01-04 ENCOUNTER — HOSPITAL ENCOUNTER (EMERGENCY)
Facility: HOSPITAL | Age: 52
Discharge: HOME | End: 2022-01-04
Attending: EMERGENCY MEDICINE
Payer: MEDICAID

## 2022-01-04 VITALS
RESPIRATION RATE: 18 BRPM | DIASTOLIC BLOOD PRESSURE: 74 MMHG | OXYGEN SATURATION: 97 % | BODY MASS INDEX: 33.13 KG/M2 | WEIGHT: 180 LBS | HEIGHT: 62 IN | SYSTOLIC BLOOD PRESSURE: 121 MMHG | TEMPERATURE: 100 F | HEART RATE: 87 BPM

## 2022-01-04 DIAGNOSIS — S63.621A SPRAIN OF INTERPHALANGEAL JOINT OF RIGHT THUMB, INITIAL ENCOUNTER: ICD-10-CM

## 2022-01-04 DIAGNOSIS — U07.1 CLINICAL DIAGNOSIS OF COVID-19: Primary | ICD-10-CM

## 2022-01-04 PROBLEM — E66.9 OBESITY (BMI 35.0-39.9 WITHOUT COMORBIDITY): Status: ACTIVE | Noted: 2021-03-29

## 2022-01-04 PROCEDURE — 29130 APPL FINGER SPLINT STATIC: CPT | Mod: F5

## 2022-01-04 PROCEDURE — 99282 EMERGENCY DEPT VISIT SF MDM: CPT | Mod: 25

## 2022-01-04 PROCEDURE — U0003 INFECTIOUS AGENT DETECTION BY NUCLEIC ACID (DNA OR RNA); SEVERE ACUTE RESPIRATORY SYNDROME CORONAVIRUS 2 (SARS-COV-2) (CORONAVIRUS DISEASE [COVID-19]), AMPLIFIED PROBE TECHNIQUE, MAKING USE OF HIGH THROUGHPUT TECHNOLOGIES AS DESCRIBED BY CMS-2020-01-R: HCPCS | Performed by: EMERGENCY MEDICINE

## 2022-01-04 PROCEDURE — 29131 APPL FINGER SPLINT DYNAMIC: CPT | Mod: F5

## 2022-01-04 NOTE — Clinical Note
"Janna Choudharypablito Jacobs was seen and treated in our emergency department on 1/4/2022.  She may return to work on 01/09/2022.       If you have any questions or concerns, please don't hesitate to call.      Ministerio Zavala PA-C"

## 2022-01-04 NOTE — DISCHARGE INSTRUCTIONS

## 2022-01-04 NOTE — ED PROVIDER NOTES
"Encounter Date: 1/4/2022       History     Chief Complaint   Patient presents with    COVID-19 Concerns     Pt reporting, facial pain, fever, and "possible thumb dislocation". Pt states her thumb in right hand "flickers". Pt denies med hx and allergies.      51-year-old female with no pertinent past medical history presents to the emergency department with multiple complaints.  She is primarily concerned about URI symptoms that include cough, nasal congestion, chills, and general myalgias that began Sunday evening.  Denies nausea, vomiting, chest pain, shortness of breath, abdominal pain, and urinary symptoms.  She also notes that she has lost her sense of taste and smell.  No no COVID-19 exposures.  She is vaccinated against COVID-19.  No medication prior to arrival.  She additionally reports 1 month history of atraumatic right thumb pain that is worse with movement.  Denies numbness and fever.    The history is provided by the patient.     Review of patient's allergies indicates:   Allergen Reactions    Norco [hydrocodone-acetaminophen]      Past Medical History:   Diagnosis Date    Former smoker     GERD (gastroesophageal reflux disease)      Past Surgical History:   Procedure Laterality Date    FINGER EXPLORATION      HYSTERECTOMY      TUBAL LIGATION       No family history on file.  Social History     Tobacco Use    Smoking status: Former Smoker     Types: Cigarettes    Smokeless tobacco: Never Used   Substance Use Topics    Alcohol use: Yes     Comment: occ    Drug use: No     Review of Systems   Constitutional: Positive for chills and fever.   HENT: Positive for congestion.    Respiratory: Positive for cough. Negative for shortness of breath.    Cardiovascular: Negative for chest pain.   Gastrointestinal: Negative for abdominal pain, nausea and vomiting.   Musculoskeletal: Positive for arthralgias and myalgias. Negative for back pain, joint swelling and neck pain.   Skin: Negative for color change " and wound.   Neurological: Negative for numbness.   All other systems reviewed and are negative.      Physical Exam     Initial Vitals [01/04/22 1357]   BP Pulse Resp Temp SpO2   121/74 87 18 99.9 °F (37.7 °C) 97 %      MAP       --         Physical Exam    Nursing note and vitals reviewed.  Constitutional: She appears well-developed and well-nourished. She is not diaphoretic. No distress.   HENT:   Head: Normocephalic and atraumatic.   Nasal congestion present.  No sinus tenderness.   Eyes: Conjunctivae and EOM are normal. Right eye exhibits no discharge. Left eye exhibits no discharge.   Neck: No tracheal deviation present. No JVD present.   Normal range of motion.  Cardiovascular: Normal rate, regular rhythm and normal heart sounds. Exam reveals no friction rub.    No murmur heard.  Pulmonary/Chest: Breath sounds normal. No stridor. No respiratory distress. She has no wheezes. She has no rhonchi. She has no rales. She exhibits no tenderness.   Musculoskeletal:         General: No tenderness or edema.      Cervical back: Normal range of motion.      Comments: Full ROM of right thumb.  Nontender.  No swelling.  No erythema.  No open wounds.  No snuffbox tenderness.     Neurological: She is alert and oriented to person, place, and time.   Skin: Skin is warm and dry. No rash and no abscess noted. No erythema. No pallor.   Psychiatric: She has a normal mood and affect. Thought content normal.         ED Course   Splint Application    Date/Time: 1/4/2022 2:21 PM  Performed by: Ministerio Zavala PA-C  Authorized by: Mckenna Bonilla MD   Consent Done: Yes  Consent: Verbal consent obtained.  Consent given by: patient  Location: R thumb.  Splint type: dynamic finger  Post-procedure: The splinted body part was neurovascularly unchanged following the procedure.  Patient tolerance: Patient tolerated the procedure well with no immediate complications        Labs Reviewed   SARS-COV-2 (COVID-19) QUALITATIVE PCR           Imaging Results    None          Medications - No data to display  Medical Decision Making:   History:   Old Medical Records: I decided to obtain old medical records.  ED Management:  This is an emergent evaluation of a 51 y.o. female presenting to the ED for URI symptoms. PCR COVID19 pending. No SOB or respiratory distress. No hypoxia. Low suspicion for PNA. Remains well appearing while in ED. Thumb pain suspicious for sprain.  No bony tenderness.  Full ROM of digit.  No strong indication for emergent imaging at this time.  No septic joint or vascular compromise.    We discuss home quarantine. Advising follow-up with PCP. Strict return precautions discussed with patient who is agreeable to the plan.    I discussed with the patient the diagnosis, treatment plan, indications for return to the emergency department, and for expected follow-up. The patient verbalized an understanding. The patient is asked if there are any questions or concerns. We discuss the case, until all issues are addressed to the patients satisfaction. Patient understands and is agreeable to the plan.                       Clinical Impression:   Final diagnoses:  [U07.1] Clinical diagnosis of COVID-19 (Primary)  [S63.621A] Sprain of interphalangeal joint of right thumb, initial encounter          ED Disposition Condition    Discharge Stable        ED Prescriptions     None        Follow-up Information     Follow up With Specialties Details Why Contact Info    Princess Vigil NP Family Medicine Schedule an appointment as soon as possible for a visit in 2 days  [address] [facility] [place] [address]  [phone]      [facility] Emergency Dept Emergency Medicine Go to  If symptoms worsen [address]  [facility] [address] [phone]           Ministerio Zavala PA-C  01/04/22 1427

## 2022-01-07 DIAGNOSIS — U07.1 COVID-19 VIRUS DETECTED: ICD-10-CM

## 2022-01-07 LAB
SARS-COV-2 RNA RESP QL NAA+PROBE: DETECTED
SARS-COV-2- CYCLE NUMBER: 30

## 2022-03-19 ENCOUNTER — HOSPITAL ENCOUNTER (EMERGENCY)
Facility: HOSPITAL | Age: 52
Discharge: HOME | End: 2022-03-19
Attending: EMERGENCY MEDICINE
Payer: MEDICAID

## 2022-03-19 VITALS
TEMPERATURE: 99 F | HEIGHT: 62 IN | HEART RATE: 106 BPM | DIASTOLIC BLOOD PRESSURE: 80 MMHG | WEIGHT: 189 LBS | RESPIRATION RATE: 18 BRPM | BODY MASS INDEX: 34.78 KG/M2 | OXYGEN SATURATION: 97 % | SYSTOLIC BLOOD PRESSURE: 122 MMHG

## 2022-03-19 DIAGNOSIS — K08.89 PAIN, DENTAL: Primary | ICD-10-CM

## 2022-03-19 PROCEDURE — 99284 EMERGENCY DEPT VISIT MOD MDM: CPT | Mod: 25

## 2022-03-19 PROCEDURE — 25000003 PHARM REV CODE 250: Performed by: NURSE PRACTITIONER

## 2022-03-19 PROCEDURE — 63600175 PHARM REV CODE 636 W HCPCS: Performed by: NURSE PRACTITIONER

## 2022-03-19 PROCEDURE — 96372 THER/PROPH/DIAG INJ SC/IM: CPT | Performed by: NURSE PRACTITIONER

## 2022-03-19 RX ORDER — AMOXICILLIN AND CLAVULANATE POTASSIUM 875; 125 MG/1; MG/1
1 TABLET, FILM COATED ORAL 2 TIMES DAILY
Qty: 20 TABLET | Refills: 0 | Status: SHIPPED | OUTPATIENT
Start: 2022-03-19 | End: 2022-09-25 | Stop reason: ALTCHOICE

## 2022-03-19 RX ORDER — SULINDAC 150 MG/1
150 TABLET ORAL 2 TIMES DAILY
Qty: 10 TABLET | Refills: 0 | Status: SHIPPED | OUTPATIENT
Start: 2022-03-19 | End: 2022-09-25 | Stop reason: ALTCHOICE

## 2022-03-19 RX ORDER — KETOROLAC TROMETHAMINE 30 MG/ML
15 INJECTION, SOLUTION INTRAMUSCULAR; INTRAVENOUS
Status: COMPLETED | OUTPATIENT
Start: 2022-03-19 | End: 2022-03-19

## 2022-03-19 RX ORDER — AMOXICILLIN AND CLAVULANATE POTASSIUM 875; 125 MG/1; MG/1
1 TABLET, FILM COATED ORAL
Status: COMPLETED | OUTPATIENT
Start: 2022-03-19 | End: 2022-03-19

## 2022-03-19 RX ADMIN — KETOROLAC TROMETHAMINE 15 MG: 30 INJECTION, SOLUTION INTRAMUSCULAR at 11:03

## 2022-03-19 RX ADMIN — AMOXICILLIN AND CLAVULANATE POTASSIUM 1 TABLET: 875; 125 TABLET, FILM COATED ORAL at 11:03

## 2022-03-19 NOTE — Clinical Note
"Janna Landry" Arlene was seen and treated in our emergency department on 3/19/2022.  She may return to work on 03/20/2022.       If you have any questions or concerns, please don't hesitate to call.       RN    "

## 2022-03-19 NOTE — ED TRIAGE NOTES
"Pt. Reports right upper side dental pain. Pt. reports she has to have a " tooth pulled". Pt. Reports she has mouth pain and bilat ear pain.   "

## 2022-03-19 NOTE — DISCHARGE INSTRUCTIONS
I have electronically prescribed medications to your pharmacy of choice.     You have been prescribed clinoril (sulindac), an anti-inflammatory.  Take this medication whether you feel you need it or not.  Do not take ibuprofen, naproxen or other NSAID's medications while taking this medication. Take antibiotics as ordered. Return to the Emergency Department for any worsening, change in condition, or any emergent concerns.

## 2022-03-19 NOTE — ED PROVIDER NOTES
Encounter Date: 3/19/2022       History     Chief Complaint   Patient presents with    Dental Pain     Patient reports dental pain and sensitivity all over her mouth x 1-2 weeks. Patient states that she did have swelling to right side of mouth but she gargled with salt water and the swelling went down. Denies fevers, chills.     The history is provided by the patient. No  was used.   Dental Pain  The primary symptoms include mouth pain. Primary symptoms do not include fever, shortness of breath, sore throat or cough. Illness onset: 1-2 weeks. The symptoms are unchanged. The symptoms are new. The symptoms occur constantly.   At its highest the mouth pain was at 10/10. The mouth pain is currently at 10/10.   Additional symptoms include: dental sensitivity to temperature and gum tenderness. Additional symptoms do not include: gum swelling, purulent gums, trismus, jaw pain, facial swelling, trouble swallowing, pain with swallowing, excessive salivation, dry mouth, taste disturbance, smell disturbance, drooling, ear pain, hearing loss, nosebleeds, swollen glands, goiter and fatigue.     Review of patient's allergies indicates:   Allergen Reactions    Norco [hydrocodone-acetaminophen]      Past Medical History:   Diagnosis Date    Former smoker     GERD (gastroesophageal reflux disease)      Past Surgical History:   Procedure Laterality Date    FINGER EXPLORATION      HYSTERECTOMY      TUBAL LIGATION       History reviewed. No pertinent family history.  Social History     Tobacco Use    Smoking status: Former Smoker     Types: Cigarettes    Smokeless tobacco: Never Used   Substance Use Topics    Alcohol use: Yes     Comment: occ    Drug use: No     Review of Systems   Constitutional: Negative for chills, fatigue and fever.   HENT: Positive for dental problem. Negative for congestion, drooling, ear discharge, ear pain, facial swelling, hearing loss, nosebleeds, postnasal drip, rhinorrhea,  sinus pressure, sneezing, sore throat, trouble swallowing and voice change.    Eyes: Negative for discharge and itching.   Respiratory: Negative for cough, shortness of breath and wheezing.    Cardiovascular: Negative for chest pain, palpitations and leg swelling.   Gastrointestinal: Negative for abdominal pain, constipation, diarrhea, nausea and vomiting.   Endocrine: Negative for polydipsia, polyphagia and polyuria.   Genitourinary: Negative for dysuria, frequency, hematuria, urgency, vaginal bleeding, vaginal discharge and vaginal pain.   Musculoskeletal: Negative for arthralgias and myalgias.   Skin: Negative for rash and wound.   Neurological: Negative for dizziness, seizures, syncope, weakness and numbness.   Hematological: Negative for adenopathy. Does not bruise/bleed easily.   Psychiatric/Behavioral: Negative for self-injury and suicidal ideas. The patient is not nervous/anxious.        Physical Exam     Initial Vitals [03/19/22 1105]   BP Pulse Resp Temp SpO2   122/80 106 18 99 °F (37.2 °C) 97 %      MAP       --         Physical Exam    Nursing note and vitals reviewed.  Constitutional: She appears well-developed and well-nourished.   HENT:   Head: Normocephalic and atraumatic.   Right Ear: External ear normal.   Left Ear: External ear normal.   Nose: Nose normal.   Floor of mouth soft and nontender.  Gums with some tenderness, no redness, warmth, no drainable collection of purulence.  Loose premolar on top right.  No facial swelling.  No trismus.   Eyes: Conjunctivae and EOM are normal. Pupils are equal, round, and reactive to light. Right eye exhibits no discharge. Left eye exhibits no discharge.   Neck:   Normal range of motion.  Abdominal: She exhibits no distension.   Musculoskeletal:         General: Normal range of motion.      Cervical back: Normal range of motion.     Neurological: She is alert and oriented to person, place, and time.   Skin: Skin is dry. Capillary refill takes less than 2  seconds.         ED Course   Procedures  Labs Reviewed - No data to display       Imaging Results    None          Medications   ketorolac injection 15 mg (15 mg Intramuscular Given 3/19/22 1124)   amoxicillin-clavulanate 875-125mg per tablet 1 tablet (1 tablet Oral Given 3/19/22 1124)     Medical Decision Making:   Initial Assessment:    52-year-old female reports dental pain for the last 1-2 weeks.  On physical exam there is no drainable collection of purulence within the oral cavity.  There is some tenderness to the right upper gingiva.  No facial swelling is present.  The patient is nontoxic.  She is afebrile.  In no apparent distress.  Differential Diagnosis:     Partial dental avulsion, full dental avulsion, infected caries, dental abscess      I feel the patient can appropriately treated with antibiotics outpatient.  She was given Augmentin 1st tablet in the emergency department and an injection of Toradol for discomfort.  She is discharged on Clinoril and Augmentin.  She should follow up with dental resource sheet repair at provided.  See AVS for additional recommendations. Medications listed herein were prescribed after reviewing the patient's allergies, medication list, history, most recent laboratories as available.  Referrals below were provided after reviewing the patient's previous medical providers. She understands she  should return for any worsening or changes in condition.  Prior to discharge the patient was asked if she  had any additional concerns or complaints and she declined. The patient was given an opportunity to ask questions and all were answered to her satisfaction.    Medications given in the ER During this Visit:  Medications   ketorolac injection 15 mg (15 mg Intramuscular Given 3/19/22 1124)   amoxicillin-clavulanate 875-125mg per tablet 1 tablet (1 tablet Oral Given 3/19/22 1124)                      Clinical Impression:   Final diagnoses:  [K08.89] Pain, dental (Primary)          ED  Disposition Condition    Discharge Stable        ED Prescriptions     Medication Sig Dispense Start Date End Date Auth. Provider    amoxicillin-clavulanate 875-125mg (AUGMENTIN) 875-125 mg per tablet Take 1 tablet by mouth 2 (two) times daily. 20 tablet 3/19/2022  August Loyd DNP    sulindac (CLINORIL) 150 MG tablet Take 1 tablet (150 mg total) by mouth 2 (two) times daily. 10 tablet 3/19/2022  August Loyd DNP        Follow-up Information     Follow up With Specialties Details Why Contact Info    Princess Vigil NP Family Medicine Schedule an appointment as soon as possible for a visit   [address] [facility] [place] [address]  [phone]      Dentistry resource from sheet provided  Schedule an appointment as soon as possible for a visit              August Loyd DNP  03/19/22 7897

## 2022-05-09 ENCOUNTER — HOSPITAL ENCOUNTER (EMERGENCY)
Facility: HOSPITAL | Age: 52
Discharge: HOME | End: 2022-05-09
Attending: EMERGENCY MEDICINE
Payer: MEDICAID

## 2022-05-09 VITALS
HEART RATE: 101 BPM | DIASTOLIC BLOOD PRESSURE: 72 MMHG | TEMPERATURE: 99 F | OXYGEN SATURATION: 95 % | SYSTOLIC BLOOD PRESSURE: 114 MMHG | RESPIRATION RATE: 18 BRPM | WEIGHT: 180 LBS | HEIGHT: 64 IN | BODY MASS INDEX: 30.73 KG/M2

## 2022-05-09 DIAGNOSIS — J06.9 VIRAL URI WITH COUGH: Primary | ICD-10-CM

## 2022-05-09 LAB
CTP QC/QA: YES
CTP QC/QA: YES
POC MOLECULAR INFLUENZA A AGN: NEGATIVE
POC MOLECULAR INFLUENZA B AGN: NEGATIVE
SARS-COV-2 RDRP RESP QL NAA+PROBE: NEGATIVE

## 2022-05-09 PROCEDURE — 87502 INFLUENZA DNA AMP PROBE: CPT

## 2022-05-09 PROCEDURE — U0002 COVID-19 LAB TEST NON-CDC: HCPCS | Performed by: EMERGENCY MEDICINE

## 2022-05-09 PROCEDURE — 99284 EMERGENCY DEPT VISIT MOD MDM: CPT | Mod: 25

## 2022-05-09 RX ORDER — ONDANSETRON 4 MG/1
4 TABLET, ORALLY DISINTEGRATING ORAL EVERY 6 HOURS PRN
Qty: 12 TABLET | Refills: 0 | Status: SHIPPED | OUTPATIENT
Start: 2022-05-09

## 2022-05-09 RX ORDER — GUAIFENESIN/DEXTROMETHORPHAN 100-10MG/5
10 SYRUP ORAL 4 TIMES DAILY PRN
Qty: 120 ML | Refills: 0 | Status: SHIPPED | OUTPATIENT
Start: 2022-05-09 | End: 2022-05-19

## 2022-05-09 RX ORDER — FLUTICASONE PROPIONATE 50 MCG
2 SPRAY, SUSPENSION (ML) NASAL DAILY PRN
Qty: 9.9 ML | Refills: 0 | Status: SHIPPED | OUTPATIENT
Start: 2022-05-09 | End: 2023-05-05

## 2022-05-09 NOTE — ED PROVIDER NOTES
Encounter Date: 5/9/2022       History     Chief Complaint   Patient presents with    Sore Throat    Cough    Nasal Congestion     Pt reports sore throat, cough, congestion, fatigued and feeling as if she has a fever since yesterday morning. Pt states she took Mucinex last night without relief. Patient denies chest pain, sob, n/v but states an episode of diarrhea yesterday.      53yo F with chief complaint 2d hx cough productive of clear/yellow sputum, congestion, sinus pressure, odynophagia, chills, myalgias, nausea without emesis, generally feeling unwell.    She admits to midsternal chest discomfort only during cough.  No associated palpitations, syncope near syncope, diaphoresis, or shortness of breath.  Over-the-counter sinus medication without relief.  No abdominal pain.  No emesis.  No diarrhea.  Poor appetite and intake since onset of symptoms.  Symptoms are acute, constant mild to moderate.  No alleviating or exacerbating factors.  No radiation of symptoms.        Review of patient's allergies indicates:   Allergen Reactions    Norco [hydrocodone-acetaminophen]      Past Medical History:   Diagnosis Date    Former smoker     GERD (gastroesophageal reflux disease)      Past Surgical History:   Procedure Laterality Date    FINGER EXPLORATION      HYSTERECTOMY      TUBAL LIGATION       History reviewed. No pertinent family history.  Social History     Tobacco Use    Smoking status: Former Smoker     Types: Cigarettes    Smokeless tobacco: Never Used   Substance Use Topics    Alcohol use: Yes     Comment: occ    Drug use: No     Review of Systems   Constitutional: Positive for appetite change, chills and fatigue. Negative for diaphoresis and fever.   HENT: Positive for congestion and sore throat.    Respiratory: Positive for cough. Negative for shortness of breath.    Cardiovascular: Positive for chest pain. Negative for palpitations.   Gastrointestinal: Positive for nausea. Negative for abdominal  pain, diarrhea and vomiting.   Musculoskeletal: Positive for myalgias. Negative for neck pain and neck stiffness.   Neurological: Negative for syncope.       Physical Exam     Initial Vitals [05/09/22 1425]   BP Pulse Resp Temp SpO2   129/71 104 20 99.6 °F (37.6 °C) 96 %      MAP       --         Physical Exam    Nursing note and vitals reviewed.  Constitutional: She appears well-developed and well-nourished. She is not diaphoretic. No distress.   Ill appearing, nontoxic.  Sitting upright on exam table.  Speaking in full sentences without pause or difficulty.   HENT:   Head: Normocephalic and atraumatic.   Mouth/Throat: Oropharynx is clear and moist.   Tonsils absent.  Turbinate edema bilaterally, bright erythema nasal mucosa.  Dry mucous membranes.   Neck: Neck supple.   Normal range of motion.  Cardiovascular: Intact distal pulses.   Sinus tachycardia.   Pulmonary/Chest: Breath sounds normal. No respiratory distress. She has no wheezes. She has no rhonchi. She exhibits tenderness.   Midsternal chest tenderness   Abdominal: There is no abdominal tenderness.   Musculoskeletal:         General: Normal range of motion.      Cervical back: Normal range of motion and neck supple.     Lymphadenopathy:     She has cervical adenopathy.   Neurological: She is alert and oriented to person, place, and time. GCS score is 15. GCS eye subscore is 4. GCS verbal subscore is 5. GCS motor subscore is 6.   Skin: Skin is warm. Capillary refill takes less than 2 seconds.   Psychiatric: She has a normal mood and affect. Thought content normal.         ED Course   Procedures  Labs Reviewed   POCT INFLUENZA A/B MOLECULAR   SARS-COV-2 RDRP GENE          Imaging Results    None          Medications - No data to display  Medical Decision Making:   Differential Diagnosis:   Viral illness, costochondritis, pneumonia, bronchitis, sinusitis, pharyngitis  Clinical Tests:   Lab Tests: Ordered and Reviewed  ED Management:  Suspected viral illness.  Possible viral pharyngitis. Continue supportive tx, f/u with PCP for reevaluation if symptoms persist, return precautions discussed/given.                       Clinical Impression:   Final diagnoses:  [J06.9] Viral URI with cough (Primary)          ED Disposition Condition    Discharge Stable        ED Prescriptions     Medication Sig Dispense Start Date End Date Auth. Provider    fluticasone propionate (FLONASE) 50 mcg/actuation nasal spray 2 sprays (100 mcg total) by Each Nostril route daily as needed (Nasal congestion). 9.9 mL 5/9/2022  River Carrillo PA-C    ondansetron (ZOFRAN-ODT) 4 MG TbDL Take 1 tablet (4 mg total) by mouth every 6 (six) hours as needed (Nausea). 12 tablet 5/9/2022  River Carrillo PA-C    dextromethorphan-guaiFENesin  mg/5 ml (ROBITUSSIN-DM)  mg/5 mL liquid Take 10 mLs by mouth 4 (four) times daily as needed (Cough). 120 mL 5/9/2022 5/19/2022 River Carrillo PA-C        Follow-up Information     Follow up With Specialties Details Why Contact Info    Princess Vigil NP Family Medicine Schedule an appointment as soon as possible for a visit  For reevaluation, If symptoms persist [address] [place] [address]  [phone]             River Carrillo PA-C  05/10/22 0221

## 2022-05-09 NOTE — DISCHARGE INSTRUCTIONS
Drink lots of fluids, stay well hydrated. Tylenol/Ibuprofen as needed for discomfort; go back and forth between these two medications every 4 hrs as needed for temp greater than or equal to 100.4F, as needed for body aches/sinus pressure/sore throat. Flonase for congestion.  Over-the-counter saline nasal rinses frequently throughout the day may also help with congestion.  Robitussin for cough.  Get some good rest.    Follow-up with primary care provider for reevaluation, further recommendations. Return to this ED if unable to treat fever, if symptoms persist or worsen despite treatment, if you begin with shortness of breath or difficulty breathing, if you experience worsening chest pain, if you begin to feel lightheaded or feel as if you are going to pass out, if no longer eating or drinking, if any other problems occur.

## 2022-05-09 NOTE — ED TRIAGE NOTES
"Pt. States, " I think I have a sinus infection". Pt. Reports she has sinus pressure, cough, chest congestion and nasal congestion.   "

## 2022-09-25 ENCOUNTER — HOSPITAL ENCOUNTER (EMERGENCY)
Facility: HOSPITAL | Age: 52
Discharge: HOME | End: 2022-09-25
Attending: EMERGENCY MEDICINE
Payer: MEDICAID

## 2022-09-25 VITALS
WEIGHT: 180 LBS | TEMPERATURE: 99 F | SYSTOLIC BLOOD PRESSURE: 149 MMHG | RESPIRATION RATE: 18 BRPM | HEIGHT: 62 IN | DIASTOLIC BLOOD PRESSURE: 71 MMHG | HEART RATE: 100 BPM | BODY MASS INDEX: 33.13 KG/M2 | OXYGEN SATURATION: 97 %

## 2022-09-25 DIAGNOSIS — K08.89 PAIN, DENTAL: ICD-10-CM

## 2022-09-25 DIAGNOSIS — M79.672 FOOT PAIN, LEFT: Primary | ICD-10-CM

## 2022-09-25 DIAGNOSIS — M77.32 CALCANEAL SPUR OF LEFT FOOT: ICD-10-CM

## 2022-09-25 DIAGNOSIS — M79.645 PAIN OF FINGER OF LEFT HAND: ICD-10-CM

## 2022-09-25 PROCEDURE — 99284 EMERGENCY DEPT VISIT MOD MDM: CPT

## 2022-09-25 PROCEDURE — 25000003 PHARM REV CODE 250: Performed by: NURSE PRACTITIONER

## 2022-09-25 RX ORDER — NAPROXEN 500 MG/1
500 TABLET ORAL
Status: COMPLETED | OUTPATIENT
Start: 2022-09-25 | End: 2022-09-25

## 2022-09-25 RX ORDER — NAPROXEN 500 MG/1
500 TABLET ORAL EVERY 12 HOURS
Qty: 10 TABLET | Refills: 0 | Status: SHIPPED | OUTPATIENT
Start: 2022-09-25 | End: 2022-09-30

## 2022-09-25 RX ORDER — AMOXICILLIN 500 MG/1
500 CAPSULE ORAL 3 TIMES DAILY
Qty: 21 CAPSULE | Refills: 0 | Status: SHIPPED | OUTPATIENT
Start: 2022-09-25 | End: 2022-10-02

## 2022-09-25 RX ORDER — AMOXICILLIN 250 MG/1
500 CAPSULE ORAL
Status: COMPLETED | OUTPATIENT
Start: 2022-09-25 | End: 2022-09-25

## 2022-09-25 RX ADMIN — AMOXICILLIN 500 MG: 250 CAPSULE ORAL at 08:09

## 2022-09-25 RX ADMIN — NAPROXEN 500 MG: 500 TABLET ORAL at 08:09

## 2022-09-25 NOTE — DISCHARGE INSTRUCTIONS
§ Please return to the Emergency Department for any new or worsening symptoms including: fever, chest pain, shortness of breath, loss of consciousness, dizziness, weakness, facial swelling, difficulty breathing or swallowing, or any other concerns.     § Schedule an appointment for follow up with  Your Primary Care Doctor or Podiatry for your foot, a dentist for your dental pain  as soon as possible for a recheck of your symptoms. If you do not have one, contact the one listed on your discharge paperwork or call the [facility] Appointment Desk at [phone] to schedule an appointment.     § If you require follow up care from a specialist and are unable to schedule an appointment with them directly, please contact your Primary Care Provider on the next business day to set up a referral.      § Please take all medication as prescribed. You have been prescribed Naproxen for pain. This is an Non-Steroidal Anti-Inflammatory (NSAID) Medication. Please do not take any additional NSAIDs while you are taking this medication including (Advil, Aleve, Motrin, Ibuprofen, Mobic\meloxicam, Naprosyn, Toradol, ketoralac, etc.). Please stop taking this medication if you experience: weakness, itching, yellow skin or eyes, joint pains, vomiting blood, blood or black stools, unusual weight gain, or swelling in your arms, legs, hands, or feet.     Rest, Use Ice Pack, Compress (use the ACE Wrap), and Elevate to help with pain and swelling of the foot.

## 2022-09-25 NOTE — ED TRIAGE NOTES
Pt. Complains of right upper tooth pain that started 1 month ago. Pt. States that her dentist appointment is not until 10/31/22. Pt. Also complains of left hand swelling that started this morning and left heel pain with swelling that started yesterday. Pt. States that her heel pain makes it difficult for her to walk. Pt. Rates her pain at a 10/10 on the pain scale.

## 2022-09-25 NOTE — Clinical Note
"Janna Mondragona" Arlene was seen and treated in our emergency department on 9/25/2022.  She may return to work on 09/28/2022.       If you have any questions or concerns, please don't hesitate to call.      Evangelina Gross RN    "

## 2022-09-25 NOTE — ED PROVIDER NOTES
"Encounter Date: 9/25/2022    SCRIBE #1 NOTE: I, Musa Campos, am scribing for, and in the presence of,  ANDREAS David. I have scribed the following portions of the note - Other sections scribed: HPI, ROS.     History     Chief Complaint   Patient presents with    Heel Pain    Dental Pain    Hand Pain     Pt c/o left heel pain which she noticed yesterday, left hand  pain since this morning, and dental pain x 1 month. Pt states she takes tylenlol for her pain without relief. Denies cp, sob, n/v/d.      CC: Left ankle pain, Left hand 4th digit pain, Right sided dental pain    HPI: Janna Jacobs, a 52 y.o. female presents to the ED complaining of left sided ankle pain beginning yesterday. Patient reports waking up yesterday to her left ankle being swollen and painful, endorsing the pain is mostly localized to the posterior aspect. Patient reports she has been "doing a lot of walking recently". The patient states she has been "hoping around because of the pain", endorsing some trouble ambulating. Patient notes attempted treatment with goody powder, ibuprofen, and applying ice to the affected area with no immediate relief noted.     Patient also reports complaints of pain to her 4th finger in her left hand that began this morning, endorsing she can't bend it due to the pain. Patient denies any recent injuries, but notes "she injured it a long time ago". Patient reports being right handed.    Patient additionally complaints of superior right sided dental pain for over a month, endorsing her tooth "feels loose", and notes a sore throat for 2 days. No other medications taken PTA. No other alleviating or exacerbating factors noted. Denies fever, headache, numbness, weakness, or other associated symptoms. Patient endorses she hasn't followed up with her PCP in over a year. Allergic to Norco.     Patient Active Problem List:     Enterocolitis     GERD (gastroesophageal reflux disease)     Neutrophilic leukocytosis   "   Sepsis     Obesity (BMI 35.0-39.9 without comorbidity)        The history is provided by the patient. No  was used.   Review of patient's allergies indicates:   Allergen Reactions    Norco [hydrocodone-acetaminophen]      Past Medical History:   Diagnosis Date    Former smoker     GERD (gastroesophageal reflux disease)      Past Surgical History:   Procedure Laterality Date    FINGER EXPLORATION      HYSTERECTOMY      TUBAL LIGATION       No family history on file.  Social History     Tobacco Use    Smoking status: Former     Types: Cigarettes    Smokeless tobacco: Never   Substance Use Topics    Alcohol use: Yes     Comment: occ    Drug use: No     Review of Systems   Constitutional:  Negative for chills and fever.   HENT:  Positive for dental problem (superior right side) and sore throat. Negative for congestion, ear discharge, ear pain, rhinorrhea and trouble swallowing.    Eyes:  Negative for visual disturbance.   Respiratory:  Negative for cough and shortness of breath.    Cardiovascular:  Negative for chest pain and leg swelling.   Gastrointestinal:  Negative for abdominal pain, diarrhea, nausea and vomiting.   Genitourinary:  Negative for dysuria.   Musculoskeletal:  Positive for arthralgias (posterior left ankle), joint swelling (left ankle) and myalgias (left sided 4th finger). Negative for back pain, neck pain and neck stiffness.   Skin:  Negative for color change, rash and wound.   Neurological:  Negative for seizures, syncope, speech difficulty, weakness and headaches.   Psychiatric/Behavioral:  Negative for confusion.      Physical Exam     Initial Vitals [09/25/22 0741]   BP Pulse Resp Temp SpO2   (!) 149/71 100 18 98.9 °F (37.2 °C) 97 %      MAP       --         Physical Exam    Nursing note and vitals reviewed.  Constitutional: She appears well-developed and well-nourished. She is not diaphoretic. She is cooperative.  Non-toxic appearance. She does not have a sickly appearance.  She does not appear ill. No distress.   HENT:   Head: Normocephalic and atraumatic.   Right Ear: Tympanic membrane and external ear normal. Tympanic membrane is not erythematous.   Left Ear: Tympanic membrane and external ear normal. Tympanic membrane is not erythematous.   Nose: Nose normal.   Mouth/Throat: Uvula is midline, oropharynx is clear and moist and mucous membranes are normal. No trismus in the jaw. Abnormal dentition. No oropharyngeal exudate, posterior oropharyngeal edema, posterior oropharyngeal erythema or tonsillar abscesses.   Tenderness to percussion over tooth 3.  No gingival erythema.  No abscess.  No facial swelling.  No facial erythema.     Neck: Phonation normal. Neck supple.   Normal range of motion.  Cardiovascular:  Normal rate, regular rhythm and intact distal pulses.           Pulses:       Radial pulses are 2+ on the left side.        Dorsalis pedis pulses are 2+ on the right side and 2+ on the left side.   Calfs soft without pain.    Pulmonary/Chest: No respiratory distress.   Abdominal: She exhibits no distension.   Musculoskeletal:      Cervical back: Normal range of motion and neck supple.      Comments: Isolated tenderness to palpation over the left 4th PIP joint.  No tenderness proximally or distally.  She is able to range.  Tenderness over the left posterior and plantar heel as well as the Achilles.  Normal Achilles function with Martinez test.  No Achilles defect appreciated.  No erythema, increased warmth, or open wounds.     Neurological: She is alert and oriented to person, place, and time. No sensory deficit. Coordination normal. GCS eye subscore is 4. GCS verbal subscore is 5. GCS motor subscore is 6.   Skin: Skin is warm, dry and intact. Capillary refill takes less than 2 seconds. No bruising, no laceration and no rash noted. No cyanosis or erythema.   Psychiatric: She has a normal mood and affect. Her speech is normal and behavior is normal. Judgment and thought content  normal.       ED Course   Procedures  Labs Reviewed - No data to display       Imaging Results              X-Ray Finger 2 or More Views Left (Final result)  Result time 09/25/22 08:34:19      Final result by Elizabeth Bell MD (09/25/22 08:34:19)                   Impression:      No evidence of fracture.No significant degenerative changes.      Electronically signed by: Elizabeth Bell MD  Date:    09/25/2022  Time:    08:34               Narrative:    EXAMINATION:  XR FINGER 2 OR MORE VIEWS LEFT    CLINICAL HISTORY:  finger pain;    TECHNIQUE:  Three views of the left fingers    COMPARISON:  None.    FINDINGS:  The alignment is within normal limits.  No displaced fractures identified.  No evidence of lytic or blastic lesions.Joint spaces are unremarkable.Soft tissues are unremarkable.                                       X-Ray Foot Complete Left (Final result)  Result time 09/25/22 08:32:42      Final result by Elizabeth Bell MD (09/25/22 08:32:42)                   Impression:      As above.      Electronically signed by: Elizabeth Bell MD  Date:    09/25/2022  Time:    08:32               Narrative:    EXAMINATION:  XR FOOT COMPLETE 3 VIEW LEFT    CLINICAL HISTORY:  .  Pain in left foot    TECHNIQUE:  AP, lateral and oblique views of the left foot were performed.    COMPARISON:  None    FINDINGS:  There is hallux valgus deformity with bunion formation and mild moderate degenerative changes of the 1st metatarsophalangeal joint.  Small Achilles enthesophyte and moderate-sized plantar calcaneal spur.  No fracture or dislocation is seen.                                       Medications   naproxen tablet 500 mg (500 mg Oral Given 9/25/22 0819)   amoxicillin capsule 500 mg (500 mg Oral Given 9/25/22 0819)     Medical Decision Making:   History:   Old Medical Records: I decided to obtain old medical records.  Clinical Tests:   Radiological Study: Ordered and Reviewed     APC / Resident Notes:   This is an  evaluation of a 52 y.o. female that presents to the Emergency Department for dental pain, finger pain, and foot pain.  No trauma or injuries. Physical Exam shows a non-toxic, afebrile, and well appearing female.  Poor dentition.  Tenderness to percussion over tooth 3.  No gingival erythema, abscess.  No facial swelling or erythema.  There is generalized tenderness over the left 4th PIP joint.  No obvious injury or trauma.  She is able to range it.  No tenderness proximally or distally.  2+ left radial pulse.  Cap refill brisk.  Generalized tenderness over the left posterior heel and plantar heel and Achilles.  Normal Martinez test.  No Achilles defect.  2+ DP pulse.  Calf soft without pain.  Swelling of the left heel when compared to the right. Vital signs are reassuring. If available, previous records reviewed. RESULTS:  X-ray of the left 4th digit without acute displaced fracture or dislocation.  X-ray of the left foot with hallux valgus of the 1st MTP.  Achilles enthesophyte and plantar calcaneal spur.  No fracture or dislocation.    My overall impression is left foot and heel pain due to calcaneal spur and Achilles enthesophyte, left finger pain, and dental pain. I considered, but at this time, do not suspect acute displaced fracture or dislocation, septic joint, cellulitis, compartment syndrome, DVT, facial cellulitis, Maik's angina, deep neck space infection, PTA.    The diagnosis, treatment plan, instructions for follow-up as well as ED return precautions were discussed. All questions have been answered.  DENISE Hummel, LULAP-C   Scribe Attestation:   Scribe #1: I performed the above scribed service and the documentation accurately describes the services I performed. I attest to the accuracy of the note.                   Clinical Impression:   Final diagnoses:  [M79.672] Foot pain, left (Primary)  [M77.32] Calcaneal spur of left foot  [K08.89] Pain, dental  [M79.645] Pain of finger of left hand     I, A.  DENISE Cruz, AV, personally performed the services described in this documentation. All medical record entries made by the scribe were at my direction and in my presence. I have reviewed the chart and agree that the record reflects my personal performance and is accurate and complete.     ED Disposition Condition    Discharge Stable          ED Prescriptions       Medication Sig Dispense Start Date End Date Auth. Provider    naproxen (NAPROSYN) 500 MG tablet Take 1 tablet (500 mg total) by mouth every 12 (twelve) hours. Do not take any additional NSAIDs while you are taking this medication including (Advil, Aleve, Motrin, Ibuprofen, Mobic\meloxicam, Naprosyn, Toradol, ketoralac, etc.). for 5 days 10 tablet 9/25/2022 9/30/2022 ANDREAS Yen    amoxicillin (AMOXIL) 500 MG capsule Take 1 capsule (500 mg total) by mouth 3 (three) times daily. for 7 days 21 capsule 9/25/2022 10/2/2022 ANDREAS Yen          Follow-up Information       Follow up With Specialties Details Why Contact Info    Iraida Jackson DPM Podiatry, Wound Care Call in 1 day To discuss your ED visit & schedule follow-up [address] [facility]  [place] [id number] [phone]      A Dentist  Schedule an appointment as soon as possible for a visit  For Follow-Up, This Week     Your Primary Care Doctor  Schedule an appointment as soon as possible for a visit  Please call and schedule an appointment for follow up this week.     [facility]  Schedule an appointment as soon as possible for a visit  For Follow-Up, This Week, If you do not have a Primary Care Doctor [address] [place] [address]  [phone]               ANDREAS Yen  09/25/22 0898

## 2023-05-05 ENCOUNTER — OFFICE VISIT (OUTPATIENT)
Dept: URGENT CARE | Facility: CLINIC | Age: 53
End: 2023-05-05
Payer: MEDICAID

## 2023-05-05 VITALS
TEMPERATURE: 98 F | HEIGHT: 62 IN | RESPIRATION RATE: 18 BRPM | SYSTOLIC BLOOD PRESSURE: 120 MMHG | DIASTOLIC BLOOD PRESSURE: 92 MMHG | WEIGHT: 209 LBS | OXYGEN SATURATION: 97 % | HEART RATE: 90 BPM | BODY MASS INDEX: 38.46 KG/M2

## 2023-05-05 DIAGNOSIS — J06.9 VIRAL URI WITH COUGH: ICD-10-CM

## 2023-05-05 DIAGNOSIS — R05.9 COUGH, UNSPECIFIED TYPE: Primary | ICD-10-CM

## 2023-05-05 LAB
CTP QC/QA: YES
CTP QC/QA: YES
FLUAV AG NPH QL: NEGATIVE
FLUBV AG NPH QL: NEGATIVE
SARS-COV-2 AG RESP QL IA.RAPID: NEGATIVE

## 2023-05-05 PROCEDURE — 99204 OFFICE O/P NEW MOD 45 MIN: CPT | Mod: S$GLB,,, | Performed by: PHYSICIAN ASSISTANT

## 2023-05-05 PROCEDURE — 87811 SARS CORONAVIRUS 2 ANTIGEN POCT, MANUAL READ: ICD-10-PCS | Mod: QW,S$GLB,, | Performed by: PHYSICIAN ASSISTANT

## 2023-05-05 PROCEDURE — 87804 POCT INFLUENZA A/B: ICD-10-PCS | Mod: 59,QW,, | Performed by: PHYSICIAN ASSISTANT

## 2023-05-05 PROCEDURE — 87811 SARS-COV-2 COVID19 W/OPTIC: CPT | Mod: QW,S$GLB,, | Performed by: PHYSICIAN ASSISTANT

## 2023-05-05 PROCEDURE — 99204 PR OFFICE/OUTPT VISIT, NEW, LEVL IV, 45-59 MIN: ICD-10-PCS | Mod: S$GLB,,, | Performed by: PHYSICIAN ASSISTANT

## 2023-05-05 PROCEDURE — 87804 INFLUENZA ASSAY W/OPTIC: CPT | Mod: 59,QW,, | Performed by: PHYSICIAN ASSISTANT

## 2023-05-05 RX ORDER — DICYCLOMINE HYDROCHLORIDE 10 MG/1
10 CAPSULE ORAL 2 TIMES DAILY PRN
COMMUNITY
Start: 2023-02-22

## 2023-05-05 RX ORDER — CIPROFLOXACIN 500 MG/1
500 TABLET ORAL 2 TIMES DAILY
COMMUNITY
Start: 2023-02-22

## 2023-05-05 RX ORDER — CYCLOBENZAPRINE HCL 5 MG
TABLET ORAL
COMMUNITY
Start: 2022-10-24

## 2023-05-05 RX ORDER — METHYLPREDNISOLONE 4 MG/1
TABLET ORAL
Qty: 21 EACH | Refills: 0 | Status: SHIPPED | OUTPATIENT
Start: 2023-05-05 | End: 2023-05-26

## 2023-05-05 RX ORDER — FLUTICASONE PROPIONATE 50 MCG
1 SPRAY, SUSPENSION (ML) NASAL DAILY
Qty: 9.9 ML | Refills: 0 | Status: SHIPPED | OUTPATIENT
Start: 2023-05-05

## 2023-05-05 RX ORDER — CEPHALEXIN 500 MG/1
500 CAPSULE ORAL 2 TIMES DAILY
COMMUNITY
Start: 2022-11-10

## 2023-05-05 RX ORDER — NAPROXEN 500 MG/1
500 TABLET ORAL 2 TIMES DAILY
Qty: 14 TABLET | Refills: 0 | Status: SHIPPED | OUTPATIENT
Start: 2023-05-05

## 2023-05-05 RX ORDER — NAPROXEN 500 MG/1
500 TABLET ORAL 2 TIMES DAILY
Qty: 14 TABLET | Refills: 0 | Status: SHIPPED | OUTPATIENT
Start: 2023-05-05 | End: 2023-05-05

## 2023-05-05 RX ORDER — FLUTICASONE PROPIONATE 50 MCG
1 SPRAY, SUSPENSION (ML) NASAL DAILY
Qty: 9.9 ML | Refills: 0 | Status: SHIPPED | OUTPATIENT
Start: 2023-05-05 | End: 2023-05-05

## 2023-05-05 RX ORDER — MINOCYCLINE HYDROCHLORIDE 100 MG/1
100 CAPSULE ORAL 2 TIMES DAILY
COMMUNITY
Start: 2022-12-13

## 2023-05-05 RX ORDER — METHYLPREDNISOLONE 4 MG/1
TABLET ORAL
Qty: 21 EACH | Refills: 0 | Status: SHIPPED | OUTPATIENT
Start: 2023-05-05 | End: 2023-05-05

## 2023-05-05 RX ORDER — HYDROXYZINE HYDROCHLORIDE 25 MG/1
TABLET, FILM COATED ORAL
COMMUNITY
Start: 2022-09-27

## 2023-05-05 RX ORDER — IBUPROFEN 800 MG/1
800 TABLET ORAL EVERY 8 HOURS PRN
COMMUNITY
Start: 2022-12-01
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
## 2023-05-05 NOTE — PROGRESS NOTES
"Subjective:      Patient ID: Janna Jacobs is a 53 y.o. female.    Vitals:  height is 5' 2" (1.575 m) and weight is 94.8 kg (209 lb). Her oral temperature is 97.5 °F (36.4 °C). Her pulse is 90. Her respiration is 18 and oxygen saturation is 97%.     Chief Complaint: Sinus Problem    Pt states that "she is experiencing pain in her knees and her legs."    Sinus Problem  This is a new problem. The current episode started yesterday. The problem has been gradually worsening since onset. There has been no fever. Her pain is at a severity of 10/10. The pain is severe. Associated symptoms include chills, congestion, coughing, ear pain, a hoarse voice, neck pain, sinus pressure and sneezing. Treatments tried: nyquil vicks. The treatment provided no relief.     Constitution: Positive for chills.   HENT:  Positive for ear pain, congestion and sinus pressure.    Neck: Positive for neck pain.   Respiratory:  Positive for cough.    Allergic/Immunologic: Positive for sneezing.    Objective:     Physical Exam    Assessment:     No diagnosis found.    Plan:       There are no diagnoses linked to this encounter.                "

## 2023-05-05 NOTE — PATIENT INSTRUCTIONS
Take medications as prescribed.  You can also take over-the-counter medications such as Robitussin or Mucinex.  If symptoms persist for greater than 7-10 days recommend re-evaluation.

## 2023-05-05 NOTE — LETTER
May 5, 2023      [facility]  [address]  [facility] [address]  Phone: [phone]       Patient: Janna Jacobs   YOB: 1970  Date of Visit: 05/05/2023    To Whom It May Concern:    [place] Jacobs  was at [facility] on 05/05/2023. The patient may return to work/school on 5/8/2023 with no restrictions. If you have any questions or concerns, or if I can be of further assistance, please do not hesitate to contact me.    Sincerely,    Estefani Hong PA-C

## 2023-07-31 ENCOUNTER — PATIENT MESSAGE (OUTPATIENT)
Dept: PHARMACY | Facility: CLINIC | Age: 53
End: 2023-07-31
Payer: MEDICAID

## 2023-08-01 ENCOUNTER — HOSPITAL ENCOUNTER (EMERGENCY)
Facility: HOSPITAL | Age: 53
Discharge: HOME | End: 2023-08-01
Attending: EMERGENCY MEDICINE
Payer: MEDICAID

## 2023-08-01 VITALS
DIASTOLIC BLOOD PRESSURE: 77 MMHG | HEIGHT: 62 IN | SYSTOLIC BLOOD PRESSURE: 127 MMHG | RESPIRATION RATE: 20 BRPM | WEIGHT: 190 LBS | BODY MASS INDEX: 34.96 KG/M2 | OXYGEN SATURATION: 98 % | HEART RATE: 92 BPM | TEMPERATURE: 99 F

## 2023-08-01 DIAGNOSIS — R11.0 NAUSEA: ICD-10-CM

## 2023-08-01 DIAGNOSIS — J34.89 RHINORRHEA: ICD-10-CM

## 2023-08-01 DIAGNOSIS — R52 BODY ACHES: Primary | ICD-10-CM

## 2023-08-01 LAB
CTP QC/QA: YES
MOLECULAR STREP A: NEGATIVE
POC MOLECULAR INFLUENZA A AGN: NEGATIVE
POC MOLECULAR INFLUENZA B AGN: NEGATIVE
SARS-COV-2 RDRP RESP QL NAA+PROBE: NEGATIVE

## 2023-08-01 PROCEDURE — 87635 SARS-COV-2 COVID-19 AMP PRB: CPT | Performed by: EMERGENCY MEDICINE

## 2023-08-01 PROCEDURE — 99283 EMERGENCY DEPT VISIT LOW MDM: CPT

## 2023-08-01 PROCEDURE — 87502 INFLUENZA DNA AMP PROBE: CPT

## 2023-08-01 PROCEDURE — 25000003 PHARM REV CODE 250

## 2023-08-01 RX ORDER — ACETAMINOPHEN 500 MG
500 TABLET ORAL EVERY 6 HOURS PRN
Qty: 14 TABLET | Refills: 0 | Status: SHIPPED | OUTPATIENT
Start: 2023-08-01 | End: 2023-08-08

## 2023-08-01 RX ORDER — ONDANSETRON 4 MG/1
4 TABLET, ORALLY DISINTEGRATING ORAL
Status: COMPLETED | OUTPATIENT
Start: 2023-08-01 | End: 2023-08-01

## 2023-08-01 RX ORDER — ONDANSETRON 4 MG/1
8 TABLET, ORALLY DISINTEGRATING ORAL EVERY 6 HOURS PRN
Qty: 14 TABLET | Refills: 0 | Status: SHIPPED | OUTPATIENT
Start: 2023-08-01 | End: 2023-08-08

## 2023-08-01 RX ORDER — ACETAMINOPHEN 500 MG
1000 TABLET ORAL
Status: COMPLETED | OUTPATIENT
Start: 2023-08-01 | End: 2023-08-01

## 2023-08-01 RX ADMIN — ONDANSETRON 4 MG: 4 TABLET, ORALLY DISINTEGRATING ORAL at 11:08

## 2023-08-01 RX ADMIN — ACETAMINOPHEN 1000 MG: 500 TABLET ORAL at 11:08

## 2023-08-01 NOTE — ED PROVIDER NOTES
Encounter Date: 8/1/2023       History     Chief Complaint   Patient presents with    Generalized Body Aches     Pt reports she received the shingles vaccine yesterday and was told she would have side effects similar to the flu. C/o body aches, headaches and nausea.      Patient is a 53yoF who presents to the ED for evaluation of HA, body aches, and nausea x 1 day. Reports receiving the shingles vaccine yesterday. Reports starting a new job and says some of her coworkers have been sick. Admits to rhinorrhea. Denies any fevers, chills, Denies any cough, congestion, sore throat, CP, SOB, abdominal pain, vomiting, and diarrhea.     The history is provided by the patient.     Review of patient's allergies indicates:   Allergen Reactions    Norco [hydrocodone-acetaminophen]      Pt reports she is not allergic to norco     Past Medical History:   Diagnosis Date    Former smoker     GERD (gastroesophageal reflux disease)      Past Surgical History:   Procedure Laterality Date    FINGER EXPLORATION      HYSTERECTOMY      TUBAL LIGATION       History reviewed. No pertinent family history.  Social History     Tobacco Use    Smoking status: Former     Current packs/day: 0.00     Types: Cigarettes    Smokeless tobacco: Never   Substance Use Topics    Alcohol use: Yes     Comment: occ    Drug use: No     Review of Systems   Constitutional:  Negative for chills and fever.   HENT:  Positive for rhinorrhea. Negative for congestion and sore throat.    Eyes:  Negative for pain.   Respiratory:  Negative for cough and shortness of breath.    Cardiovascular:  Negative for chest pain.   Gastrointestinal:  Positive for nausea. Negative for abdominal pain, diarrhea and vomiting.   Genitourinary:  Negative for dysuria.   Musculoskeletal:  Negative for back pain.   Skin:  Negative for rash.   Neurological:  Positive for headaches. Negative for weakness.   Hematological:  Does not bruise/bleed easily.       Physical Exam     Initial Vitals  [08/01/23 1041]   BP Pulse Resp Temp SpO2   127/77 110 20 98.8 °F (37.1 °C) 99 %      MAP       --         Physical Exam    Nursing note and vitals reviewed.  Constitutional: She appears well-developed and well-nourished.   HENT:   Head: Normocephalic and atraumatic.   Right Ear: External ear normal.   Left Ear: External ear normal.   Eyes: Conjunctivae and EOM are normal. Pupils are equal, round, and reactive to light.   Neck:   Normal range of motion.  Cardiovascular:  Normal rate, regular rhythm and normal heart sounds.     Exam reveals no gallop and no friction rub.       No murmur heard.  Pulmonary/Chest: Breath sounds normal. No respiratory distress. She has no wheezes. She has no rhonchi. She has no rales.   Abdominal: Abdomen is soft. Bowel sounds are normal. She exhibits no distension. There is no abdominal tenderness. There is no rebound and no guarding.   Musculoskeletal:         General: Normal range of motion.      Cervical back: Normal range of motion.     Neurological: She is alert and oriented to person, place, and time. She has normal strength. No cranial nerve deficit or sensory deficit. GCS score is 15. GCS eye subscore is 4. GCS verbal subscore is 5. GCS motor subscore is 6.   No obvious focal neurologic deficits   Skin: Skin is warm.   Psychiatric: She has a normal mood and affect.         ED Course   Procedures  Labs Reviewed   POCT INFLUENZA A/B MOLECULAR   SARS-COV-2 RDRP GENE   POCT STREP A MOLECULAR          Imaging Results    None          Medications   ondansetron disintegrating tablet 4 mg (4 mg Oral Given 8/1/23 1118)   acetaminophen tablet 1,000 mg (1,000 mg Oral Given 8/1/23 1141)     Medical Decision Making:   Clinical Tests:   Lab Tests: Ordered and Reviewed  ED Management:  This is an evaluation of a 53yoF who presents to the ED for evaluation of HA, body aches, and nausea x 1 day. Physical exam reveals RRR w/o MRG. Lungs are CTA bilaterally. Abdomen soft, non-tender,  non-distended, with normal bowel sounds. Differential diagnosis includes but is not limited to COVID/Flu, Normal Immune reaction, and other viral illness. Considered guillain barre syndrome since history of new vaccination but highly doubtful given normal neuro exam without any deficits, stable vital signs, and no ascending limb weakness. Workup initiated with COVID, Flu, and strep swabs. Patient given Zofran for nausea. Reports no nausea after medication. Patient was slightly tachycardic in triage, patient given tylenol as I suspect the tachycardia is a result of a developing fever. All labs negative. Will send home with Zofran and tylenol for symptomatic care. I rechecked patient vital signs before discharge and her tachycardia had resolved, currently pulse of 92. Patient stable for discharge. Patient verbalizes understanding of care plan. Discussed return precautions with patient. Instructed follow up with PCP.                           Clinical Impression:   Final diagnoses:  [R52] Body aches (Primary)  [R11.0] Nausea  [J34.89] Rhinorrhea        ED Disposition Condition    Discharge Stable          ED Prescriptions       Medication Sig Dispense Start Date End Date Auth. Provider    ondansetron (ZOFRAN-ODT) 4 MG TbDL Take 2 tablets (8 mg total) by mouth every 6 (six) hours as needed (for nausea and vomiting). 14 tablet 8/1/2023 8/8/2023 Genaro Camejo PA-C    acetaminophen (TYLENOL) 500 MG tablet Take 1 tablet (500 mg total) by mouth every 6 (six) hours as needed for Pain. 14 tablet 8/1/2023 8/8/2023 Genaro Camejo PA-C          Follow-up Information       Follow up With Specialties Details Why Contact Info    Princess Vigil NP Family Medicine Schedule an appointment as soon as possible for a visit in 2 days for follow up [address] [place] [address]  [phone]      [facility] - Emergency Dept Emergency Medicine Go to  As needed, If symptoms worsen, or new symptoms develop [address]  [place] [address]  [phone]             Genaro Camejo PA-C  08/01/23 1222

## 2023-08-01 NOTE — DISCHARGE INSTRUCTIONS
Thank you for coming to our Emergency Department today. It is important to remember that some problems or medical conditions are difficult to diagnose and may not be found or addressed during your Emergency Department visit.  These conditions often start with non-specific symptoms and can only be diagnosed on follow up visits with your primary care physician or specialist when the symptoms continue or change. Please remember that all medical conditions can change, and we cannot predict how you will be feeling tomorrow or the next day. Return to the ER with any questions/concerns, new/concerning symptoms, worsening or failure to improve.       Be sure to follow up with your primary care doctor and review all labs/imaging/tests that were performed during your ER visit with them. It is very common for us to identify non-emergent incidental findings which must be followed up with your primary care physician.  Some labs/imaging/tests may be outside of the normal range, and require non-emergent follow-up and/or further investigation/treatment/procedures/testing to help diagnose/exclude/prevent complications or other potentially serious medical conditions. Some abnormalities may not have been discussed or addressed during your ER visit.     An ER visit does not replace a primary care visit, and many screening tests or follow-up tests cannot be ordered by an ER doctor or performed by the ER. Some tests may even require pre-approval.    If you do not have a primary care doctor, you may contact the one listed on your discharge paperwork or you may also call the [facility] Appointment Desk at [phone] , or [address] [place] [address] at  [phone] to schedule an appointment, or establish care with a primary care doctor or even a specialist and to obtain information about local resources. It is important to your health that you have a primary care doctor.    Please take all medications as directed. We have done our best to select  a medication for you that will treat your condition however, all medications may potentially have side-effects and it is impossible to predict which medications may give you side-effects or what those side-effects (if any) those medications may give you.  If you feel that you are having a negative effect or side-effect of any medication you should stop taking those medications immediately and seek medical attention. If you feel that you are having a life-threatening reaction call 911.        Do not drive, swim, climb to height, take a bath, operate heavy machinery, drink alcohol or take potentially sedating medications, sign any legal documents or make any important decisions for 24 hours if you have received any pain medications, sedatives or mood altering drugs during your ER visit or within 24 hours of taking them if they have been prescribed to you.     You can find additional resources for Dentists, hearing aids, durable medical equipment, low cost pharmacies and other resources at [email or web address]://[email or web address]

## 2023-08-01 NOTE — Clinical Note
"Janna Choudharypablito Jacobs was seen and treated in our emergency department on 8/1/2023.  She may return to work on 08/04/2023.       If you have any questions or concerns, please don't hesitate to call.      Leonardo Montiel MD"

## 2023-08-01 NOTE — ED TRIAGE NOTES
Pt. Reports she received the shingle vaccine on yesterday and is having possible side effects from the meds. Pt. Reports she has nausea, gen body aches and chills.

## 2024-08-26 ENCOUNTER — OCCUPATIONAL HEALTH (OUTPATIENT)
Dept: URGENT CARE | Facility: CLINIC | Age: 54
End: 2024-08-26

## 2024-08-26 PROCEDURE — 86706 HEP B SURFACE ANTIBODY: CPT | Mod: S$GLB,,, | Performed by: STUDENT IN AN ORGANIZED HEALTH CARE EDUCATION/TRAINING PROGRAM

## 2024-08-26 PROCEDURE — 86480 TB TEST CELL IMMUN MEASURE: CPT | Mod: S$GLB,,, | Performed by: STUDENT IN AN ORGANIZED HEALTH CARE EDUCATION/TRAINING PROGRAM

## 2024-08-26 PROCEDURE — 86799 MMR TITER: CPT | Mod: S$GLB,,, | Performed by: STUDENT IN AN ORGANIZED HEALTH CARE EDUCATION/TRAINING PROGRAM

## 2024-08-26 PROCEDURE — 86787 VARICELLA-ZOSTER ANTIBODY: CPT | Mod: S$GLB,,, | Performed by: STUDENT IN AN ORGANIZED HEALTH CARE EDUCATION/TRAINING PROGRAM

## 2024-08-26 PROCEDURE — 90714 TD VACC NO PRESV 7 YRS+ IM: CPT | Mod: S$GLB,,, | Performed by: STUDENT IN AN ORGANIZED HEALTH CARE EDUCATION/TRAINING PROGRAM

## 2024-08-28 LAB
GAMMA INTERFERON BACKGROUND BLD IA-ACNC: 0.07 IU/ML
HBV SURFACE AB SER-ACNC: <3.5 MIU/ML
M TB IFN-G BLD-IMP: NEGATIVE
M TB IFN-G CD4+ BCKGRND COR BLD-ACNC: 0.06 IU/ML
M TB IFN-G CD4+CD8+ BCKGRND COR BLD-ACNC: 0.07 IU/ML
MEV IGG SER IA-ACNC: 16.8 AU/ML
MITOGEN IGNF BCKGRD COR BLD-ACNC: >10 IU/ML
MUV IGG SER IA-ACNC: 15.1 AU/ML
QUANTIFERON TB GOLD (INCUBATED): NORMAL
RUBV IGG SERPL IA-ACNC: 1.26 INDEX
SERVICE CMNT-IMP: NORMAL
VZV IGG SER IA-ACNC: >4000 INDEX

## 2024-11-07 ENCOUNTER — OCCUPATIONAL HEALTH (OUTPATIENT)
Dept: URGENT CARE | Facility: CLINIC | Age: 54
End: 2024-11-07

## 2024-11-07 DIAGNOSIS — Z23 ENCOUNTER FOR IMMUNIZATION: Primary | ICD-10-CM

## 2025-03-26 ENCOUNTER — HOSPITAL ENCOUNTER (EMERGENCY)
Facility: HOSPITAL | Age: 55
Discharge: HOME | End: 2025-03-26
Attending: EMERGENCY MEDICINE

## 2025-03-26 VITALS
SYSTOLIC BLOOD PRESSURE: 138 MMHG | RESPIRATION RATE: 16 BRPM | HEIGHT: 61 IN | DIASTOLIC BLOOD PRESSURE: 72 MMHG | HEART RATE: 79 BPM | WEIGHT: 209 LBS | BODY MASS INDEX: 39.46 KG/M2 | TEMPERATURE: 98 F | OXYGEN SATURATION: 99 %

## 2025-03-26 DIAGNOSIS — M77.11 LATERAL EPICONDYLITIS OF RIGHT ELBOW: Primary | ICD-10-CM

## 2025-03-26 PROCEDURE — 99283 EMERGENCY DEPT VISIT LOW MDM: CPT

## 2025-03-26 PROCEDURE — 63600175 PHARM REV CODE 636 W HCPCS: Performed by: EMERGENCY MEDICINE

## 2025-03-26 RX ORDER — BUPIVACAINE HYDROCHLORIDE 5 MG/ML
10 INJECTION, SOLUTION EPIDURAL; INTRACAUDAL; PERINEURAL
Status: COMPLETED | OUTPATIENT
Start: 2025-03-26 | End: 2025-03-26

## 2025-03-26 RX ORDER — TRIAMCINOLONE ACETONIDE 40 MG/ML
40 INJECTION, SUSPENSION INTRA-ARTICULAR; INTRAMUSCULAR
Status: COMPLETED | OUTPATIENT
Start: 2025-03-26 | End: 2025-03-26

## 2025-03-26 RX ORDER — DICLOFENAC SODIUM 50 MG/1
50 TABLET, DELAYED RELEASE ORAL 3 TIMES DAILY PRN
Qty: 15 TABLET | Refills: 0 | Status: SHIPPED | OUTPATIENT
Start: 2025-03-26 | End: 2026-03-26

## 2025-03-26 RX ADMIN — BUPIVACAINE HYDROCHLORIDE 50 MG: 5 INJECTION, SOLUTION EPIDURAL; INTRACAUDAL; PERINEURAL at 04:03

## 2025-03-26 RX ADMIN — TRIAMCINOLONE ACETONIDE 40 MG: 40 INJECTION, SUSPENSION INTRA-ARTICULAR; INTRAMUSCULAR at 04:03

## 2025-03-26 NOTE — Clinical Note
"Janna Landry" Arlene was seen and treated in our emergency department on 3/26/2025.  She may return to work on 03/27/2025.       If you have any questions or concerns, please don't hesitate to call.       RN    "

## 2025-03-26 NOTE — ED PROVIDER NOTES
Encounter Date: 3/26/2025       History     Chief Complaint   Patient presents with    Arm Pain     Right arm pain in the elbow down into her forearm x 1 week with burning      Chief complaint: Elbow pain    HPI: 55-year-old female presents with a 3 day history of nontraumatic right elbow pain.  The pain is worse with flexion and with pronation and supination of the hand.  She denies any history of inflammatory arthritis and has no swelling, redness with no night sweats or fever.  She denies any known history of gout.      Review of patient's allergies indicates:   Allergen Reactions    Norco [hydrocodone-acetaminophen]      Pt reports she is not allergic to norco     Past Medical History:   Diagnosis Date    Former smoker     GERD (gastroesophageal reflux disease)      Past Surgical History:   Procedure Laterality Date    FINGER EXPLORATION      HYSTERECTOMY      TUBAL LIGATION       No family history on file.  Social History[1]  Review of Systems   Constitutional:  Negative for fever.   Respiratory:  Negative for shortness of breath.    Cardiovascular:  Negative for chest pain.   Gastrointestinal:  Negative for abdominal distention.   Genitourinary:  Negative for difficulty urinating.   Musculoskeletal:  Positive for arthralgias. Negative for gait problem.   Skin:  Negative for rash.   Neurological:  Negative for weakness and headaches.   Psychiatric/Behavioral:  Negative for confusion.        Physical Exam     Initial Vitals [03/26/25 1620]   BP Pulse Resp Temp SpO2   (!) 149/97 99 19 98.3 °F (36.8 °C) 100 %      MAP       --         Physical Exam    Nursing note and vitals reviewed.  Constitutional: Vital signs are normal. She appears well-developed and well-nourished. She is active.  Non-toxic appearance. She does not have a sickly appearance. No distress.   HENT:   Head: Normocephalic and atraumatic.   Eyes: Conjunctivae and lids are normal.   Neck: Phonation normal.   Cardiovascular:  Normal rate.            Pulmonary/Chest: No respiratory distress.   Abdominal: She exhibits no distension.   Musculoskeletal:         General: Tenderness (tenderness without swelling of the right medial and lateral epicondyle) present.      Comments: No joint effusion of the elbow with no olecranon swelling or tenderness     Neurological: She is alert and oriented to person, place, and time.   Skin: Skin is warm and intact.   Psychiatric: She has a normal mood and affect. Her speech is normal.         ED Course   Procedures  Labs Reviewed - No data to display       Imaging Results    None          Medications   triamcinolone acetonide injection 40 mg (40 mg Other Given by Provider 3/26/25 1935)   BUPivacaine (PF) 0.5% (5 mg/mL) injection 50 mg (50 mg Subcutaneous Given by Provider 3/26/25 5015)     Medical Decision Making  55-year-old female presents with pain with associated tenderness of the medial and lateral epicondyle of the right elbow.  Pain is greatest over the lateral epicondyle consistent with lateral epicondylitis.  The epicondyle is injected with 40 mg of triamcinolone and 1 CC of bupivacaine 0.5%.  There is no evidence of septic arthritis or inflammatory arthritis.  The bursa is nontender with no evidence of olecranon bursitis.  She has no trauma with fracture unlikely.    Risk  Prescription drug management.                                      Clinical Impression:  Final diagnoses:  [M77.11] Lateral epicondylitis of right elbow (Primary)          ED Disposition Condition    Discharge Stable          ED Prescriptions       Medication Sig Dispense Start Date End Date Auth. Provider    diclofenac (VOLTAREN) 50 MG EC tablet Take 1 tablet (50 mg total) by mouth 3 (three) times daily as needed (pain). 15 tablet 3/26/2025 3/26/2026 Erasto Sam III, MD          Follow-up Information       Follow up With Specialties Details Why Contact Info    Juan Clarke MD Orthopedic Surgery In 1 week  [address]  [place]  [id number]  [phone]                   [1]   Social History  Tobacco Use    Smoking status: Former     Types: Cigarettes    Smokeless tobacco: Never   Substance Use Topics    Alcohol use: Yes     Comment: occ    Drug use: No        Erasto Sam III, MD  03/26/25 7899

## 2025-08-01 ENCOUNTER — HOSPITAL ENCOUNTER (EMERGENCY)
Facility: HOSPITAL | Age: 55
Discharge: HOME | End: 2025-08-01
Attending: EMERGENCY MEDICINE
Payer: COMMERCIAL

## 2025-08-01 VITALS
BODY MASS INDEX: 38.92 KG/M2 | SYSTOLIC BLOOD PRESSURE: 103 MMHG | DIASTOLIC BLOOD PRESSURE: 61 MMHG | RESPIRATION RATE: 17 BRPM | HEART RATE: 82 BPM | TEMPERATURE: 99 F | WEIGHT: 206 LBS | OXYGEN SATURATION: 99 %

## 2025-08-01 DIAGNOSIS — U07.1 COVID-19 VIRUS INFECTION: Primary | ICD-10-CM

## 2025-08-01 DIAGNOSIS — U07.1 COVID-19 VIRUS DETECTED: ICD-10-CM

## 2025-08-01 DIAGNOSIS — R11.2 NAUSEA AND VOMITING, UNSPECIFIED VOMITING TYPE: ICD-10-CM

## 2025-08-01 LAB
ABSOLUTE EOSINOPHIL (SMH): 0.07 K/UL
ABSOLUTE MONOCYTE (SMH): 1.69 K/UL (ref 0.3–1)
ABSOLUTE NEUTROPHIL COUNT (SMH): 8 K/UL (ref 1.8–7.7)
ALBUMIN SERPL-MCNC: 4 G/DL (ref 3.5–5.2)
ALP SERPL-CCNC: 67 UNIT/L (ref 55–135)
ALT SERPL-CCNC: 10 UNIT/L (ref 10–44)
ANION GAP (SMH): 6 MMOL/L (ref 8–16)
APTT PPP: 31.8 SECONDS (ref 21–32)
AST SERPL-CCNC: 14 UNIT/L (ref 10–40)
BASOPHILS # BLD AUTO: 0.04 K/UL
BASOPHILS NFR BLD AUTO: 0.4 %
BILIRUB SERPL-MCNC: 0.4 MG/DL (ref 0.1–1)
BILIRUB UR QL STRIP.AUTO: NEGATIVE
BNP SERPL-MCNC: 29 PG/ML
BUN SERPL-MCNC: 8 MG/DL (ref 6–20)
CALCIUM SERPL-MCNC: 8.8 MG/DL (ref 8.7–10.5)
CHLORIDE SERPL-SCNC: 104 MMOL/L (ref 95–110)
CK SERPL-CCNC: 61 U/L (ref 20–180)
CLARITY UR: CLEAR
CO2 SERPL-SCNC: 24 MMOL/L (ref 23–29)
COLOR UR AUTO: YELLOW
CREAT SERPL-MCNC: 0.6 MG/DL (ref 0.5–1.4)
CREAT SERPL-MCNC: 0.7 MG/DL (ref 0.5–1.4)
ERYTHROCYTE [DISTWIDTH] IN BLOOD BY AUTOMATED COUNT: 14.5 % (ref 11.5–14.5)
GFR SERPLBLD CREATININE-BSD FMLA CKD-EPI: >60 ML/MIN/1.73/M2
GLUCOSE SERPL-MCNC: 96 MG/DL (ref 70–110)
GLUCOSE UR QL STRIP: NEGATIVE
GROUP A STREP MOLECULAR (OHS): NEGATIVE
HCT VFR BLD AUTO: 38.4 % (ref 37–48.5)
HGB BLD-MCNC: 12.7 GM/DL (ref 12–16)
HGB UR QL STRIP: ABNORMAL
IMM GRANULOCYTES # BLD AUTO: 0.06 K/UL (ref 0–0.04)
IMM GRANULOCYTES NFR BLD AUTO: 0.6 % (ref 0–0.5)
INFLUENZA A MOLECULAR (OHS): NEGATIVE
INFLUENZA B MOLECULAR (OHS): NEGATIVE
INR PPP: 1 (ref 0.8–1.2)
KETONES UR QL STRIP: NEGATIVE
LDH SERPL L TO P-CCNC: 0.53 MMOL/L (ref 0.5–2.2)
LEUKOCYTE ESTERASE UR QL STRIP: NEGATIVE
LIPASE SERPL-CCNC: 10 U/L (ref 4–60)
LIPASE SERPL-CCNC: 9 U/L (ref 4–60)
LYMPHOCYTES # BLD AUTO: 0.62 K/UL (ref 1–4.8)
MAGNESIUM SERPL-MCNC: 1.8 MG/DL (ref 1.6–2.6)
MAGNESIUM SERPL-MCNC: 1.9 MG/DL (ref 1.6–2.6)
MCH RBC QN AUTO: 26.8 PG (ref 27–31)
MCHC RBC AUTO-ENTMCNC: 33.1 G/DL (ref 32–36)
MCV RBC AUTO: 81 FL (ref 82–98)
NITRITE UR QL STRIP: NEGATIVE
NUCLEATED RBC (/100WBC) (SMH): 0 /100 WBC
OHS QRS DURATION: 76 MS
OHS QTC CALCULATION: 455 MS
PH UR STRIP: 7 [PH]
PHOSPHATE SERPL-MCNC: 2.8 MG/DL (ref 2.7–4.5)
PLATELET # BLD AUTO: 314 K/UL (ref 150–450)
PMV BLD AUTO: 10.6 FL (ref 9.2–12.9)
POTASSIUM SERPL-SCNC: 4 MMOL/L (ref 3.5–5.1)
PROCALCITONIN SERPL-MCNC: <0.05 NG/ML
PROT SERPL-MCNC: 7.1 GM/DL (ref 6–8.4)
PROT UR QL STRIP: NEGATIVE
PROTHROMBIN TIME: 11.4 SECONDS (ref 9–12.5)
RBC # BLD AUTO: 4.73 M/UL (ref 4–5.4)
RELATIVE EOSINOPHIL (SMH): 0.7 % (ref 0–8)
RELATIVE LYMPHOCYTE (SMH): 5.9 % (ref 18–48)
RELATIVE MONOCYTE (SMH): 16.2 % (ref 4–15)
RELATIVE NEUTROPHIL (SMH): 76.2 % (ref 38–73)
SAMPLE: NORMAL
SAMPLE: NORMAL
SARS-COV-2 RDRP RESP QL NAA+PROBE: POSITIVE
SODIUM SERPL-SCNC: 134 MMOL/L (ref 136–145)
SP GR UR STRIP: 1.02
TROPONIN HIGH SENSITIVE (SMH): 2.4 PG/ML
TSH SERPL-ACNC: 0.37 UIU/ML (ref 0.34–5.6)
UROBILINOGEN UR STRIP-ACNC: NEGATIVE EU/DL
WBC # BLD AUTO: 10.46 K/UL (ref 3.9–12.7)

## 2025-08-01 PROCEDURE — 84484 ASSAY OF TROPONIN QUANT: CPT | Performed by: EMERGENCY MEDICINE

## 2025-08-01 PROCEDURE — 36415 COLL VENOUS BLD VENIPUNCTURE: CPT | Performed by: EMERGENCY MEDICINE

## 2025-08-01 PROCEDURE — 87651 STREP A DNA AMP PROBE: CPT | Performed by: EMERGENCY MEDICINE

## 2025-08-01 PROCEDURE — 99285 EMERGENCY DEPT VISIT HI MDM: CPT | Mod: 25

## 2025-08-01 PROCEDURE — 82565 ASSAY OF CREATININE: CPT

## 2025-08-01 PROCEDURE — 84075 ASSAY ALKALINE PHOSPHATASE: CPT | Performed by: EMERGENCY MEDICINE

## 2025-08-01 PROCEDURE — 84100 ASSAY OF PHOSPHORUS: CPT | Performed by: EMERGENCY MEDICINE

## 2025-08-01 PROCEDURE — 93005 ELECTROCARDIOGRAM TRACING: CPT | Performed by: INTERNAL MEDICINE

## 2025-08-01 PROCEDURE — 63600175 PHARM REV CODE 636 W HCPCS: Performed by: EMERGENCY MEDICINE

## 2025-08-01 PROCEDURE — 87502 INFLUENZA DNA AMP PROBE: CPT | Performed by: EMERGENCY MEDICINE

## 2025-08-01 PROCEDURE — 82550 ASSAY OF CK (CPK): CPT | Performed by: EMERGENCY MEDICINE

## 2025-08-01 PROCEDURE — 84443 ASSAY THYROID STIM HORMONE: CPT | Performed by: EMERGENCY MEDICINE

## 2025-08-01 PROCEDURE — 25000003 PHARM REV CODE 250: Performed by: EMERGENCY MEDICINE

## 2025-08-01 PROCEDURE — 96361 HYDRATE IV INFUSION ADD-ON: CPT

## 2025-08-01 PROCEDURE — 96375 TX/PRO/DX INJ NEW DRUG ADDON: CPT

## 2025-08-01 PROCEDURE — 83880 ASSAY OF NATRIURETIC PEPTIDE: CPT | Performed by: EMERGENCY MEDICINE

## 2025-08-01 PROCEDURE — 85025 COMPLETE CBC W/AUTO DIFF WBC: CPT | Performed by: EMERGENCY MEDICINE

## 2025-08-01 PROCEDURE — 96365 THER/PROPH/DIAG IV INF INIT: CPT

## 2025-08-01 PROCEDURE — 25500020 PHARM REV CODE 255: Performed by: EMERGENCY MEDICINE

## 2025-08-01 PROCEDURE — 93010 ELECTROCARDIOGRAM REPORT: CPT | Mod: ,,, | Performed by: INTERNAL MEDICINE

## 2025-08-01 PROCEDURE — 85610 PROTHROMBIN TIME: CPT | Performed by: EMERGENCY MEDICINE

## 2025-08-01 PROCEDURE — 87040 BLOOD CULTURE FOR BACTERIA: CPT | Performed by: EMERGENCY MEDICINE

## 2025-08-01 PROCEDURE — 83690 ASSAY OF LIPASE: CPT | Performed by: EMERGENCY MEDICINE

## 2025-08-01 PROCEDURE — 85730 THROMBOPLASTIN TIME PARTIAL: CPT | Performed by: EMERGENCY MEDICINE

## 2025-08-01 PROCEDURE — 83735 ASSAY OF MAGNESIUM: CPT | Performed by: EMERGENCY MEDICINE

## 2025-08-01 PROCEDURE — U0002 COVID-19 LAB TEST NON-CDC: HCPCS | Performed by: EMERGENCY MEDICINE

## 2025-08-01 PROCEDURE — 84145 PROCALCITONIN (PCT): CPT | Performed by: EMERGENCY MEDICINE

## 2025-08-01 PROCEDURE — 81003 URINALYSIS AUTO W/O SCOPE: CPT | Performed by: EMERGENCY MEDICINE

## 2025-08-01 RX ORDER — ONDANSETRON HYDROCHLORIDE 2 MG/ML
4 INJECTION, SOLUTION INTRAVENOUS
Status: COMPLETED | OUTPATIENT
Start: 2025-08-01 | End: 2025-08-01

## 2025-08-01 RX ORDER — ONDANSETRON 8 MG/1
8 TABLET, ORALLY DISINTEGRATING ORAL EVERY 6 HOURS PRN
Qty: 12 TABLET | Refills: 0 | Status: SHIPPED | OUTPATIENT
Start: 2025-08-01

## 2025-08-01 RX ORDER — ACETAMINOPHEN 500 MG
1000 TABLET ORAL
Status: COMPLETED | OUTPATIENT
Start: 2025-08-01 | End: 2025-08-01

## 2025-08-01 RX ADMIN — ACETAMINOPHEN 1000 MG: 500 TABLET, FILM COATED ORAL at 01:08

## 2025-08-01 RX ADMIN — IBUPROFEN 600 MG: 400 TABLET ORAL at 04:08

## 2025-08-01 RX ADMIN — IOHEXOL 100 ML: 350 INJECTION, SOLUTION INTRAVENOUS at 04:08

## 2025-08-01 RX ADMIN — ACETAMINOPHEN 1000 MG: 500 TABLET ORAL at 07:08

## 2025-08-01 RX ADMIN — SODIUM CHLORIDE 1434 ML: 9 INJECTION, SOLUTION INTRAVENOUS at 02:08

## 2025-08-01 RX ADMIN — ONDANSETRON 4 MG: 2 INJECTION INTRAMUSCULAR; INTRAVENOUS at 01:08

## 2025-08-01 RX ADMIN — PIPERACILLIN AND TAZOBACTAM 4.5 G: 4; .5 INJECTION, POWDER, LYOPHILIZED, FOR SOLUTION INTRAVENOUS at 01:08

## 2025-08-06 LAB
BACTERIA BLD CULT: NORMAL
BACTERIA BLD CULT: NORMAL

## 2025-08-12 ENCOUNTER — OCCUPATIONAL HEALTH (OUTPATIENT)
Dept: URGENT CARE | Facility: CLINIC | Age: 55
End: 2025-08-12

## 2025-08-12 DIAGNOSIS — Z00.00 ROUTINE GENERAL MEDICAL EXAMINATION AT A HEALTH CARE FACILITY: Primary | ICD-10-CM

## 2025-08-12 PROCEDURE — 86480 TB TEST CELL IMMUN MEASURE: CPT | Mod: S$GLB,,, | Performed by: EMERGENCY MEDICINE

## 2025-08-16 LAB
GAMMA INTERFERON BACKGROUND BLD IA-ACNC: 0.05 IU/ML
M TB IFN-G BLD-IMP: NEGATIVE
M TB IFN-G CD4+ BCKGRND COR BLD-ACNC: 0.04 IU/ML
M TB IFN-G CD4+CD8+ BCKGRND COR BLD-ACNC: 0.04 IU/ML
MITOGEN IGNF BCKGRD COR BLD-ACNC: >10 IU/ML
QUANTIFERON TB GOLD (INCUBATED): NORMAL
SERVICE CMNT-IMP: NORMAL